# Patient Record
Sex: FEMALE | Race: WHITE | NOT HISPANIC OR LATINO | URBAN - METROPOLITAN AREA
[De-identification: names, ages, dates, MRNs, and addresses within clinical notes are randomized per-mention and may not be internally consistent; named-entity substitution may affect disease eponyms.]

---

## 2017-12-07 ENCOUNTER — OFFICE (AMBULATORY)
Dept: URBAN - METROPOLITAN AREA CLINIC 64 | Facility: CLINIC | Age: 52
End: 2017-12-07
Payer: COMMERCIAL

## 2017-12-07 ENCOUNTER — ON CAMPUS - OUTPATIENT (AMBULATORY)
Dept: URBAN - METROPOLITAN AREA HOSPITAL 2 | Facility: HOSPITAL | Age: 52
End: 2017-12-07
Payer: COMMERCIAL

## 2017-12-07 VITALS
DIASTOLIC BLOOD PRESSURE: 59 MMHG | RESPIRATION RATE: 14 BRPM | DIASTOLIC BLOOD PRESSURE: 70 MMHG | RESPIRATION RATE: 13 BRPM | SYSTOLIC BLOOD PRESSURE: 91 MMHG | OXYGEN SATURATION: 96 % | DIASTOLIC BLOOD PRESSURE: 46 MMHG | HEART RATE: 54 BPM | OXYGEN SATURATION: 100 % | OXYGEN SATURATION: 99 % | OXYGEN SATURATION: 98 % | SYSTOLIC BLOOD PRESSURE: 94 MMHG | DIASTOLIC BLOOD PRESSURE: 64 MMHG | DIASTOLIC BLOOD PRESSURE: 71 MMHG | DIASTOLIC BLOOD PRESSURE: 51 MMHG | HEART RATE: 50 BPM | HEART RATE: 52 BPM | RESPIRATION RATE: 16 BRPM | HEIGHT: 64 IN | DIASTOLIC BLOOD PRESSURE: 67 MMHG | SYSTOLIC BLOOD PRESSURE: 84 MMHG | SYSTOLIC BLOOD PRESSURE: 138 MMHG | SYSTOLIC BLOOD PRESSURE: 101 MMHG | RESPIRATION RATE: 18 BRPM | TEMPERATURE: 97.3 F | HEART RATE: 62 BPM | WEIGHT: 135 LBS | SYSTOLIC BLOOD PRESSURE: 90 MMHG | HEART RATE: 48 BPM | HEART RATE: 65 BPM | SYSTOLIC BLOOD PRESSURE: 144 MMHG | HEART RATE: 51 BPM | DIASTOLIC BLOOD PRESSURE: 58 MMHG

## 2017-12-07 DIAGNOSIS — Z12.11 ENCOUNTER FOR SCREENING FOR MALIGNANT NEOPLASM OF COLON: ICD-10-CM

## 2017-12-07 DIAGNOSIS — K62.1 RECTAL POLYP: ICD-10-CM

## 2017-12-07 LAB
GI HISTOLOGY: A. UNSPECIFIED: (no result)
GI HISTOLOGY: PDF REPORT: (no result)

## 2017-12-07 PROCEDURE — 45385 COLONOSCOPY W/LESION REMOVAL: CPT | Mod: 33

## 2017-12-07 PROCEDURE — 88305 TISSUE EXAM BY PATHOLOGIST: CPT | Mod: 33

## 2017-12-07 RX ADMIN — PROPOFOL: 10 INJECTION, EMULSION INTRAVENOUS at 08:09

## 2018-03-08 ENCOUNTER — ON CAMPUS - OUTPATIENT (AMBULATORY)
Dept: URBAN - METROPOLITAN AREA HOSPITAL 2 | Facility: HOSPITAL | Age: 53
End: 2018-03-08

## 2018-03-08 VITALS
RESPIRATION RATE: 16 BRPM | HEART RATE: 53 BPM | WEIGHT: 137 LBS | HEART RATE: 51 BPM | HEART RATE: 56 BPM | OXYGEN SATURATION: 100 % | SYSTOLIC BLOOD PRESSURE: 157 MMHG | RESPIRATION RATE: 19 BRPM | OXYGEN SATURATION: 95 % | OXYGEN SATURATION: 97 % | SYSTOLIC BLOOD PRESSURE: 175 MMHG | OXYGEN SATURATION: 96 % | HEIGHT: 64 IN | DIASTOLIC BLOOD PRESSURE: 80 MMHG | SYSTOLIC BLOOD PRESSURE: 119 MMHG | TEMPERATURE: 97.2 F | DIASTOLIC BLOOD PRESSURE: 79 MMHG | SYSTOLIC BLOOD PRESSURE: 122 MMHG | DIASTOLIC BLOOD PRESSURE: 91 MMHG | DIASTOLIC BLOOD PRESSURE: 83 MMHG

## 2018-03-08 DIAGNOSIS — K21.9 GASTRO-ESOPHAGEAL REFLUX DISEASE WITHOUT ESOPHAGITIS: ICD-10-CM

## 2018-03-08 DIAGNOSIS — T18.128A FOOD IN ESOPHAGUS CAUSING OTHER INJURY, INITIAL ENCOUNTER: ICD-10-CM

## 2018-03-08 PROCEDURE — 43235 EGD DIAGNOSTIC BRUSH WASH: CPT

## 2018-03-08 RX ORDER — OMEPRAZOLE 40 MG/1
40 CAPSULE, DELAYED RELEASE ORAL
Qty: 90 | Refills: 3 | Status: ACTIVE
Start: 2018-03-08

## 2018-03-08 RX ADMIN — PROPOFOL: 10 INJECTION, EMULSION INTRAVENOUS at 07:32

## 2019-07-02 ENCOUNTER — HOSPITAL ENCOUNTER (OUTPATIENT)
Facility: HOSPITAL | Age: 54
Setting detail: OBSERVATION
Discharge: HOME | End: 2019-07-03
Attending: INTERNAL MEDICINE | Admitting: INTERNAL MEDICINE

## 2019-07-02 DIAGNOSIS — N17.9 ACUTE RENAL FAILURE, UNSPECIFIED ACUTE RENAL FAILURE TYPE (HCC): Primary | ICD-10-CM

## 2019-07-02 DIAGNOSIS — E87.6 HYPOKALEMIA: ICD-10-CM

## 2019-07-02 DIAGNOSIS — E83.52 HYPERCALCEMIA: ICD-10-CM

## 2019-07-02 LAB
ALBUMIN SERPL-MCNC: 4.2 G/DL (ref 3.5–4.8)
ALBUMIN/GLOB SERPL: 1.2 G/DL (ref 1–1.7)
ALP SERPL-CCNC: 41 U/L (ref 32–91)
ALT SERPL W P-5'-P-CCNC: 32 U/L (ref 14–54)
ANION GAP SERPL CALCULATED.3IONS-SCNC: 14.1 MMOL/L (ref 10–20)
AST SERPL-CCNC: 32 U/L (ref 15–41)
BACTERIA UR QL AUTO: ABNORMAL /HPF
BASOPHILS # BLD AUTO: 0.1 10*3/MM3 (ref 0–0.2)
BASOPHILS NFR BLD AUTO: 0.9 % (ref 0–1.5)
BILIRUB SERPL-MCNC: 0.8 MG/DL (ref 0.3–1.2)
BILIRUB UR QL STRIP: NEGATIVE
BUN BLD-MCNC: 31 MG/DL (ref 8–20)
BUN/CREAT SERPL: 14.1 (ref 5.4–26.2)
CALCIUM SPEC-SCNC: 11.8 MG/DL (ref 8.9–10.3)
CHLORIDE SERPL-SCNC: 104 MMOL/L (ref 101–111)
CLARITY UR: CLEAR
CO2 SERPL-SCNC: 23 MMOL/L (ref 22–32)
COLOR UR: YELLOW
CREAT BLD-MCNC: 2.2 MG/DL (ref 0.4–1)
DEPRECATED RDW RBC AUTO: 40.3 FL (ref 37–54)
EOSINOPHIL # BLD AUTO: 0.2 10*3/MM3 (ref 0–0.4)
EOSINOPHIL NFR BLD AUTO: 2.8 % (ref 0.3–6.2)
ERYTHROCYTE [DISTWIDTH] IN BLOOD BY AUTOMATED COUNT: 12.9 % (ref 12.3–15.4)
GFR SERPL CREATININE-BSD FRML MDRD: 23 ML/MIN/1.73
GLOBULIN UR ELPH-MCNC: 3.4 GM/DL (ref 2.5–3.8)
GLUCOSE BLD-MCNC: 87 MG/DL (ref 65–99)
GLUCOSE UR STRIP-MCNC: NEGATIVE MG/DL
HCT VFR BLD AUTO: 32.2 % (ref 34–46.6)
HGB BLD-MCNC: 11.3 G/DL (ref 12–15.9)
HGB UR QL STRIP.AUTO: ABNORMAL
HYALINE CASTS UR QL AUTO: ABNORMAL /LPF
KETONES UR QL STRIP: NEGATIVE
LEUKOCYTE ESTERASE UR QL STRIP.AUTO: NEGATIVE
LYMPHOCYTES # BLD AUTO: 1.8 10*3/MM3 (ref 0.7–3.1)
LYMPHOCYTES NFR BLD AUTO: 29.4 % (ref 19.6–45.3)
MCH RBC QN AUTO: 31 PG (ref 26.6–33)
MCHC RBC AUTO-ENTMCNC: 35 G/DL (ref 31.5–35.7)
MCV RBC AUTO: 88.5 FL (ref 79–97)
MONOCYTES # BLD AUTO: 0.3 10*3/MM3 (ref 0.1–0.9)
MONOCYTES NFR BLD AUTO: 5.5 % (ref 5–12)
NEUTROPHILS # BLD AUTO: 3.7 10*3/MM3 (ref 1.7–7)
NEUTROPHILS NFR BLD AUTO: 61.4 % (ref 42.7–76)
NITRITE UR QL STRIP: NEGATIVE
NRBC BLD AUTO-RTO: 0.1 /100 WBC (ref 0–0.2)
PH UR STRIP.AUTO: 6.5 [PH] (ref 5–8)
PLATELET # BLD AUTO: 235 10*3/MM3 (ref 140–450)
PMV BLD AUTO: 7.4 FL (ref 6–12)
POTASSIUM BLD-SCNC: 3.1 MMOL/L (ref 3.6–5.1)
PROT SERPL-MCNC: 7.6 G/DL (ref 6.1–7.9)
PROT UR QL STRIP: NEGATIVE
RBC # BLD AUTO: 3.63 10*6/MM3 (ref 3.77–5.28)
RBC # UR: ABNORMAL /HPF
REF LAB TEST METHOD: ABNORMAL
SODIUM BLD-SCNC: 138 MMOL/L (ref 136–144)
SP GR UR STRIP: 1.01 (ref 1–1.03)
SQUAMOUS #/AREA URNS HPF: ABNORMAL /HPF
UROBILINOGEN UR QL STRIP: ABNORMAL
WBC NRBC COR # BLD: 6 10*3/MM3 (ref 3.4–10.8)
WBC UR QL AUTO: ABNORMAL /HPF

## 2019-07-02 PROCEDURE — 96360 HYDRATION IV INFUSION INIT: CPT

## 2019-07-02 PROCEDURE — 93005 ELECTROCARDIOGRAM TRACING: CPT | Performed by: NURSE PRACTITIONER

## 2019-07-02 PROCEDURE — 25010000002 HEPARIN (PORCINE) PER 1000 UNITS: Performed by: NURSE PRACTITIONER

## 2019-07-02 PROCEDURE — 80053 COMPREHEN METABOLIC PANEL: CPT | Performed by: NURSE PRACTITIONER

## 2019-07-02 PROCEDURE — 81001 URINALYSIS AUTO W/SCOPE: CPT | Performed by: NURSE PRACTITIONER

## 2019-07-02 PROCEDURE — 99283 EMERGENCY DEPT VISIT LOW MDM: CPT

## 2019-07-02 PROCEDURE — 99219 PR INITIAL OBSERVATION CARE/DAY 50 MINUTES: CPT | Performed by: NURSE PRACTITIONER

## 2019-07-02 PROCEDURE — 85025 COMPLETE CBC W/AUTO DIFF WBC: CPT | Performed by: NURSE PRACTITIONER

## 2019-07-02 PROCEDURE — G0378 HOSPITAL OBSERVATION PER HR: HCPCS

## 2019-07-02 PROCEDURE — 96372 THER/PROPH/DIAG INJ SC/IM: CPT

## 2019-07-02 RX ORDER — ONDANSETRON 2 MG/ML
4 INJECTION INTRAMUSCULAR; INTRAVENOUS EVERY 6 HOURS PRN
Status: DISCONTINUED | OUTPATIENT
Start: 2019-07-02 | End: 2019-07-03 | Stop reason: HOSPADM

## 2019-07-02 RX ORDER — MONTELUKAST SODIUM 10 MG/1
10 TABLET ORAL NIGHTLY
COMMUNITY

## 2019-07-02 RX ORDER — SODIUM CHLORIDE 0.9 % (FLUSH) 0.9 %
3 SYRINGE (ML) INJECTION EVERY 12 HOURS SCHEDULED
Status: DISCONTINUED | OUTPATIENT
Start: 2019-07-02 | End: 2019-07-03 | Stop reason: HOSPADM

## 2019-07-02 RX ORDER — CALCITRIOL 0.25 UG/1
0.25 CAPSULE, LIQUID FILLED ORAL DAILY
COMMUNITY

## 2019-07-02 RX ORDER — ATORVASTATIN CALCIUM 10 MG/1
10 TABLET, FILM COATED ORAL DAILY
COMMUNITY

## 2019-07-02 RX ORDER — ACETAMINOPHEN, ASPIRIN AND CAFFEINE 250; 250; 65 MG/1; MG/1; MG/1
2 TABLET, FILM COATED ORAL DAILY PRN
COMMUNITY
End: 2019-07-23

## 2019-07-02 RX ORDER — HEPARIN SODIUM 5000 [USP'U]/ML
5000 INJECTION, SOLUTION INTRAVENOUS; SUBCUTANEOUS EVERY 12 HOURS SCHEDULED
Status: DISCONTINUED | OUTPATIENT
Start: 2019-07-02 | End: 2019-07-03 | Stop reason: HOSPADM

## 2019-07-02 RX ORDER — NAPROXEN SODIUM 220 MG
220 TABLET ORAL 2 TIMES DAILY PRN
COMMUNITY
End: 2019-07-03 | Stop reason: HOSPADM

## 2019-07-02 RX ORDER — PHENOL 1.4 %
4000 AEROSOL, SPRAY (ML) MUCOUS MEMBRANE DAILY
COMMUNITY
End: 2019-07-23

## 2019-07-02 RX ORDER — SODIUM CHLORIDE 0.9 % (FLUSH) 0.9 %
10 SYRINGE (ML) INJECTION AS NEEDED
Status: DISCONTINUED | OUTPATIENT
Start: 2019-07-02 | End: 2019-07-03 | Stop reason: HOSPADM

## 2019-07-02 RX ORDER — SODIUM CHLORIDE 9 MG/ML
100 INJECTION, SOLUTION INTRAVENOUS CONTINUOUS
Status: DISCONTINUED | OUTPATIENT
Start: 2019-07-02 | End: 2019-07-03 | Stop reason: HOSPADM

## 2019-07-02 RX ORDER — POTASSIUM CHLORIDE 20 MEQ/1
40 TABLET, EXTENDED RELEASE ORAL ONCE
Status: COMPLETED | OUTPATIENT
Start: 2019-07-02 | End: 2019-07-02

## 2019-07-02 RX ORDER — LEVOTHYROXINE SODIUM 0.12 MG/1
125 TABLET ORAL DAILY
Status: ON HOLD | COMMUNITY
End: 2019-07-25 | Stop reason: SDUPTHER

## 2019-07-02 RX ORDER — AMLODIPINE BESYLATE 5 MG/1
5 TABLET ORAL DAILY
COMMUNITY

## 2019-07-02 RX ORDER — DOXYCYCLINE HYCLATE 50 MG/1
50 CAPSULE ORAL 2 TIMES DAILY
COMMUNITY

## 2019-07-02 RX ORDER — SODIUM CHLORIDE 0.9 % (FLUSH) 0.9 %
3-10 SYRINGE (ML) INJECTION AS NEEDED
Status: DISCONTINUED | OUTPATIENT
Start: 2019-07-02 | End: 2019-07-03 | Stop reason: HOSPADM

## 2019-07-02 RX ADMIN — SODIUM CHLORIDE 100 ML/HR: 900 INJECTION, SOLUTION INTRAVENOUS at 23:49

## 2019-07-02 RX ADMIN — HEPARIN SODIUM 5000 UNITS: 5000 INJECTION INTRAVENOUS; SUBCUTANEOUS at 22:45

## 2019-07-02 RX ADMIN — POTASSIUM CHLORIDE 40 MEQ: 1500 TABLET, EXTENDED RELEASE ORAL at 22:45

## 2019-07-02 RX ADMIN — SODIUM CHLORIDE 1000 ML: 900 INJECTION, SOLUTION INTRAVENOUS at 18:16

## 2019-07-02 RX ADMIN — Medication 10 ML: at 22:41

## 2019-07-02 NOTE — ED PROVIDER NOTES
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  Patient states she had appointment with new internal medicine physician this morning and had labs drawn.  She states that her physician called her this afternoon and told her to come to the ED for further evaluation of her abnormal labs.  She states her GFR was 23, creatinine was 2.3, and she had an elevated calcium level.  Patient denies any complaints and states she \"feels fine.\"            Review of Systems   Constitutional: Negative for fatigue and fever.   HENT: Negative for sore throat.    Respiratory: Negative for cough, chest tightness and shortness of breath.    Cardiovascular: Negative for chest pain, palpitations and leg swelling.   Gastrointestinal: Negative for abdominal pain, diarrhea, nausea and vomiting.   Genitourinary: Negative for difficulty urinating, dyspareunia, dysuria, frequency and urgency.   Musculoskeletal: Negative for back pain.   Skin: Negative for rash.   Neurological: Negative for dizziness, weakness, numbness and headaches.       No past medical history on file.    No Known Allergies    No past surgical history on file.    No family history on file.    Social History     Socioeconomic History   • Marital status:      Spouse name: Not on file   • Number of children: Not on file   • Years of education: Not on file   • Highest education level: Not on file           Objective   Physical Exam   Constitutional: She appears well-developed.   Vital signs and triage nurse note reviewed.  Constitutional: Awake, alert; well-developed and well-nourished. No acute distress is noted.  HEENT: Normocephalic, atraumatic; pupils are PERRL with intact EOM; oropharynx is pink and moist without exudate or erythema.  No drooling or pooling of oral secretions.  Neck: Supple, full " range of motion without pain; no cervical lymphadenopathy. Normal phonation.  Cardiovascular: Regular rate and rhythm, normal S1-S2.  No murmur noted.  Pulmonary: Respiratory effort regular nonlabored, breath sounds clear to auscultation all fields.  Abdomen: Soft, nontender, nondistended with normoactive bowel sounds; no rebound or guarding.  Musculoskeletal: Independent range of motion of all extremities with no palpable tenderness or edema.  Neuro: Alert oriented x3, speech is clear and appropriate, GCS 15.    Skin: Flesh tone, warm, dry, intact; no erythematous or petechial rash or lesion.        Procedures           ED Course      Labs Reviewed   COMPREHENSIVE METABOLIC PANEL - Abnormal; Notable for the following components:       Result Value    BUN 31 (*)     Creatinine 2.20 (*)     Potassium 3.1 (*)     Calcium 11.8 (*)     eGFR Non  Amer 23 (*)     All other components within normal limits   URINALYSIS W/ CULTURE IF INDICATED - Abnormal; Notable for the following components:    Blood, UA Trace (*)     All other components within normal limits   CBC WITH AUTO DIFFERENTIAL - Abnormal; Notable for the following components:    RBC 3.63 (*)     Hemoglobin 11.3 (*)     Hematocrit 32.2 (*)     All other components within normal limits   URINALYSIS, MICROSCOPIC ONLY - Abnormal; Notable for the following components:    WBC, UA 0-2 (*)     Squamous Epithelial Cells, UA 3-6 (*)     All other components within normal limits   CBC AND DIFFERENTIAL    Narrative:     The following orders were created for panel order CBC & Differential.  Procedure                               Abnormality         Status                     ---------                               -----------         ------                     CBC Auto Differential[[id number]]        Abnormal            Final result                 Please view results for these tests on the individual orders.     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  She had labs obtained.  She was given 1 L normal saline IV fluid bolus.    Upon review of labs, patient was discussed with her primary care physician Dr. Floyd who recommends admission with nephrology consultation due to acute nature of patient's abnormal labs.    Diagnosis and treatment plan discussed with patient.  Patient agreeable to plan.   Patient was discussed with the hospitalist nurse practitioner.  She will be admitted to the hospital for further evaluation and treatment.    Patient Progress  Patient progress: stable        Final diagnoses:   Acute renal failure, unspecified acute renal failure type (CMS/[facility])   Hypercalcemia   Hypokalemia            Nicolle Zhou APRN  07/02/19 2001

## 2019-07-03 VITALS
RESPIRATION RATE: 17 BRPM | OXYGEN SATURATION: 99 % | BODY MASS INDEX: 21.53 KG/M2 | WEIGHT: 126.1 LBS | DIASTOLIC BLOOD PRESSURE: 79 MMHG | TEMPERATURE: 98.1 F | SYSTOLIC BLOOD PRESSURE: 124 MMHG | HEART RATE: 56 BPM | HEIGHT: 64 IN

## 2019-07-03 LAB
ANION GAP SERPL CALCULATED.3IONS-SCNC: 12.1 MMOL/L (ref 10–20)
ANION GAP SERPL CALCULATED.3IONS-SCNC: 13.4 MMOL/L (ref 10–20)
ARTICHOKE IGE QN: 99 MG/DL (ref 0–100)
BASOPHILS # BLD AUTO: 0 10*3/MM3 (ref 0–0.2)
BASOPHILS NFR BLD AUTO: 0.7 % (ref 0–1.5)
BUN BLD-MCNC: 27 MG/DL (ref 8–20)
BUN BLD-MCNC: 27 MG/DL (ref 8–20)
BUN/CREAT SERPL: 14.2 (ref 5.4–26.2)
BUN/CREAT SERPL: 15 (ref 5.4–26.2)
CALCIUM SPEC-SCNC: 10.1 MG/DL (ref 8.9–10.3)
CALCIUM SPEC-SCNC: 9.8 MG/DL (ref 8.9–10.3)
CHLORIDE SERPL-SCNC: 110 MMOL/L (ref 101–111)
CHLORIDE SERPL-SCNC: 111 MMOL/L (ref 101–111)
CHOLEST SERPL-MCNC: 158 MG/DL
CO2 SERPL-SCNC: 21 MMOL/L (ref 22–32)
CO2 SERPL-SCNC: 23 MMOL/L (ref 22–32)
CREAT BLD-MCNC: 1.8 MG/DL (ref 0.4–1)
CREAT BLD-MCNC: 1.9 MG/DL (ref 0.4–1)
DEPRECATED RDW RBC AUTO: 41.1 FL (ref 37–54)
EOSINOPHIL # BLD AUTO: 0.2 10*3/MM3 (ref 0–0.4)
EOSINOPHIL NFR BLD AUTO: 3.7 % (ref 0.3–6.2)
ERYTHROCYTE [DISTWIDTH] IN BLOOD BY AUTOMATED COUNT: 13.1 % (ref 12.3–15.4)
GFR SERPL CREATININE-BSD FRML MDRD: 27 ML/MIN/1.73
GFR SERPL CREATININE-BSD FRML MDRD: 29 ML/MIN/1.73
GLUCOSE BLD-MCNC: 78 MG/DL (ref 65–99)
GLUCOSE BLD-MCNC: 86 MG/DL (ref 65–99)
HCT VFR BLD AUTO: 29.8 % (ref 34–46.6)
HDLC SERPL QL: 3.76
HDLC SERPL-MCNC: 42 MG/DL
HGB BLD-MCNC: 10.5 G/DL (ref 12–15.9)
LDLC/HDLC SERPL: 1.97 {RATIO}
LYMPHOCYTES # BLD AUTO: 2.4 10*3/MM3 (ref 0.7–3.1)
LYMPHOCYTES NFR BLD AUTO: 40.1 % (ref 19.6–45.3)
MAGNESIUM SERPL-MCNC: 1.6 MG/DL (ref 1.8–2.5)
MCH RBC QN AUTO: 31.4 PG (ref 26.6–33)
MCHC RBC AUTO-ENTMCNC: 35.1 G/DL (ref 31.5–35.7)
MCV RBC AUTO: 89.4 FL (ref 79–97)
MONOCYTES # BLD AUTO: 0.4 10*3/MM3 (ref 0.1–0.9)
MONOCYTES NFR BLD AUTO: 6.5 % (ref 5–12)
NEUTROPHILS # BLD AUTO: 3 10*3/MM3 (ref 1.7–7)
NEUTROPHILS NFR BLD AUTO: 49 % (ref 42.7–76)
NRBC BLD AUTO-RTO: 0.1 /100 WBC (ref 0–0.2)
PHOSPHATE SERPL-MCNC: 2.8 MG/DL (ref 2.4–4.7)
PLATELET # BLD AUTO: 209 10*3/MM3 (ref 140–450)
PMV BLD AUTO: 7.8 FL (ref 6–12)
POTASSIUM BLD-SCNC: 3.1 MMOL/L (ref 3.6–5.1)
POTASSIUM BLD-SCNC: 4.4 MMOL/L (ref 3.6–5.1)
RBC # BLD AUTO: 3.34 10*6/MM3 (ref 3.77–5.28)
SODIUM BLD-SCNC: 140 MMOL/L (ref 136–144)
SODIUM BLD-SCNC: 143 MMOL/L (ref 136–144)
TRIGL SERPL-MCNC: 167 MG/DL
TSH SERPL DL<=0.05 MIU/L-ACNC: 18.54 MIU/ML (ref 0.34–5.6)
VLDLC SERPL-MCNC: 33.4 MG/DL
WBC NRBC COR # BLD: 6 10*3/MM3 (ref 3.4–10.8)

## 2019-07-03 PROCEDURE — 80048 BASIC METABOLIC PNL TOTAL CA: CPT | Performed by: NURSE PRACTITIONER

## 2019-07-03 PROCEDURE — 80061 LIPID PANEL: CPT | Performed by: NURSE PRACTITIONER

## 2019-07-03 PROCEDURE — 84100 ASSAY OF PHOSPHORUS: CPT | Performed by: NURSE PRACTITIONER

## 2019-07-03 PROCEDURE — 99217 PR OBSERVATION CARE DISCHARGE MANAGEMENT: CPT | Performed by: INTERNAL MEDICINE

## 2019-07-03 PROCEDURE — 51798 US URINE CAPACITY MEASURE: CPT

## 2019-07-03 PROCEDURE — 83735 ASSAY OF MAGNESIUM: CPT | Performed by: NURSE PRACTITIONER

## 2019-07-03 PROCEDURE — 84443 ASSAY THYROID STIM HORMONE: CPT | Performed by: NURSE PRACTITIONER

## 2019-07-03 PROCEDURE — 85025 COMPLETE CBC W/AUTO DIFF WBC: CPT | Performed by: NURSE PRACTITIONER

## 2019-07-03 PROCEDURE — 80048 BASIC METABOLIC PNL TOTAL CA: CPT | Performed by: INTERNAL MEDICINE

## 2019-07-03 PROCEDURE — G0378 HOSPITAL OBSERVATION PER HR: HCPCS

## 2019-07-03 PROCEDURE — 96361 HYDRATE IV INFUSION ADD-ON: CPT

## 2019-07-03 RX ORDER — LEVOTHYROXINE SODIUM 0.12 MG/1
125 TABLET ORAL
Status: DISCONTINUED | OUTPATIENT
Start: 2019-07-03 | End: 2019-07-03 | Stop reason: HOSPADM

## 2019-07-03 RX ORDER — MONTELUKAST SODIUM 10 MG/1
10 TABLET ORAL NIGHTLY
Status: DISCONTINUED | OUTPATIENT
Start: 2019-07-03 | End: 2019-07-03 | Stop reason: HOSPADM

## 2019-07-03 RX ORDER — POTASSIUM CHLORIDE 20 MEQ/1
40 TABLET, EXTENDED RELEASE ORAL ONCE
Status: COMPLETED | OUTPATIENT
Start: 2019-07-03 | End: 2019-07-03

## 2019-07-03 RX ORDER — AMLODIPINE BESYLATE 5 MG/1
5 TABLET ORAL DAILY
Status: DISCONTINUED | OUTPATIENT
Start: 2019-07-03 | End: 2019-07-03 | Stop reason: HOSPADM

## 2019-07-03 RX ORDER — CALCITRIOL 0.25 UG/1
0.25 CAPSULE, LIQUID FILLED ORAL 2 TIMES DAILY
Status: DISCONTINUED | OUTPATIENT
Start: 2019-07-03 | End: 2019-07-03 | Stop reason: HOSPADM

## 2019-07-03 RX ORDER — ATORVASTATIN CALCIUM 10 MG/1
10 TABLET, FILM COATED ORAL DAILY
Status: DISCONTINUED | OUTPATIENT
Start: 2019-07-03 | End: 2019-07-03 | Stop reason: HOSPADM

## 2019-07-03 RX ADMIN — SODIUM CHLORIDE 100 ML/HR: 900 INJECTION, SOLUTION INTRAVENOUS at 14:11

## 2019-07-03 RX ADMIN — ATORVASTATIN CALCIUM 10 MG: 10 TABLET, FILM COATED ORAL at 08:34

## 2019-07-03 RX ADMIN — MONTELUKAST SODIUM 10 MG: 10 TABLET, COATED ORAL at 01:50

## 2019-07-03 RX ADMIN — LEVOTHYROXINE SODIUM 125 MCG: 125 TABLET ORAL at 05:53

## 2019-07-03 RX ADMIN — POTASSIUM CHLORIDE 40 MEQ: 1500 TABLET, EXTENDED RELEASE ORAL at 08:33

## 2019-07-03 RX ADMIN — AMLODIPINE BESYLATE 5 MG: 5 TABLET ORAL at 08:34

## 2019-07-03 NOTE — PLAN OF CARE
Problem: Patient Care Overview  Goal: Plan of Care Review  Outcome: Outcome(s) achieved Date Met: 07/03/19    Goal: Individualization and Mutuality  Outcome: Outcome(s) achieved Date Met: 07/03/19    Goal: Discharge Needs Assessment  Outcome: Outcome(s) achieved Date Met: 07/03/19    Goal: Interprofessional Rounds/Family Conf  Outcome: Outcome(s) achieved Date Met: 07/03/19      Problem: Pain, Chronic (Adult)  Goal: Identify Related Risk Factors and Signs and Symptoms  Outcome: Outcome(s) achieved Date Met: 07/03/19    Goal: Acceptable Pain/Comfort Level and Functional Ability  Outcome: Outcome(s) achieved Date Met: 07/03/19      Problem: Renal Failure/Kidney Injury, Acute (Adult)  Goal: Signs and Symptoms of Listed Potential Problems Will be Absent, Minimized or Managed (Renal Failure/Kidney Injury, Acute)  Outcome: Outcome(s) achieved Date Met: 07/03/19

## 2019-07-03 NOTE — DISCHARGE SUMMARY
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 female presented with acute kidney injury.  She had been taking increased amounts of NSAIDs for shoulder pain.  Creatinine was found to be 2.3 she was started on IV fluids with improvement to 1.8 by the time of discharge.  She was wanting to go home and as she was having adequate urine output and felt stable to discharge home with outpatient follow-up for BMP testing.    Past Medical History:     Past Medical History:   Diagnosis Date   • Arthritis    • Cancer (CMS/HCC)    • Elevated cholesterol    • History of transfusion    • Hypertension        Past Surgical History:     Past Surgical History:   Procedure Laterality Date   • ABDOMINAL SURGERY     • COLONOSCOPY     • SKIN BIOPSY     • TONSILLECTOMY         Social History:   Social History     Socioeconomic History   • Marital status:      Spouse name: Not on file   • Number of children: Not on file   • Years of education: Not on file   • Highest education level: Not on file   Tobacco Use   • Smoking status: Former Smoker     Packs/day: 0.25     Years: 10.00     Pack years: 2.50     Types: Cigarettes   • Smokeless tobacco: Never Used   Substance and Sexual Activity   • Alcohol use: No     Frequency: Never   • Drug use: No   • Sexual activity: Defer       Procedures  Performed:none         Consults:   Consults     Date and Time Order Name Status Description    7/2/2019 1931 Hospitalist (on-call MD unless specified) Completed           Pertinent Test Results:     Lab Results (last 72 hours)     Procedure Component Value Units Date/Time    Basic Metabolic Panel [[id number]]  (Abnormal) Collected:  07/03/19 1459    Specimen:  Blood Updated:  07/03/19 1652     Glucose 86 mg/dL      BUN 27 mg/dL      Creatinine 1.80 mg/dL      Sodium 140 mmol/L      Potassium 4.4 mmol/L      Comment: Result checked         Chloride 110 mmol/L      CO2 21.0 mmol/L      Calcium 9.8 mg/dL      eGFR Non African Amer 29 mL/min/1.73      BUN/Creatinine Ratio 15.0     Anion Gap 13.4 mmol/L     Basic Metabolic Panel [[id number]]  (Abnormal) Collected:  07/03/19 0243    Specimen:  Blood Updated:  07/03/19 0354     Glucose 78 mg/dL      BUN 27 mg/dL      Creatinine 1.90 mg/dL      Sodium 143 mmol/L      Potassium 3.1 mmol/L      Chloride 111 mmol/L      CO2 23.0 mmol/L      Calcium 10.1 mg/dL      eGFR Non African Amer 27 mL/min/1.73      BUN/Creatinine Ratio 14.2     Anion Gap 12.1 mmol/L     Lipid Panel [[id number]]  (Abnormal) Collected:  07/03/19 0243    Specimen:  Blood Updated:  07/03/19 0354     Total Cholesterol 158 mg/dL      Triglycerides 167 mg/dL      HDL Cholesterol 42 mg/dL      LDL Cholesterol  99 mg/dL      VLDL Cholesterol 33.4 mg/dL      LDL/HDL Ratio 1.97     Chol/HDL Ratio 3.76    Narrative:       The following guidelines have been recommended by the NCEP for Total Cholesterol, Total Triglycerides, LDL Cholesterol, and HDL Cholesterols    Total Cholesterol  Desirable:        <200 mg/dL  Borderline High:  200-239 mg/dL  High:             > or = 240 mg/dL    Total Triglyceride  Normal:           <150 mg/dL  Borderline High:  150-199 mg/dL  High:             200-499 mg/dL  Very High:        > or = 500 mg/dL    HDL Cholesterol  Low HDL:          <40 mg/dL  Normal:           40-60 mg/dL  Desirable:         >60 mg/dL    LDL Cholesterol  Optimal:          <100 mg/dL  Low Risk:         100-129 mg/dL  Borderline High:  130-159 mg/dL  High:             160-189 mg/dL  Very High:        > or = 190 mg/dL    The following ratios of LDL to HDL and Total cholesterol to HDL are for information only:    LDL/HDL Ratio  Desirable:        <5  Optimal:          < or = 3.5    Total Cholesterol/HDL Ratio  Low Risk:         3.3-4.4  Average Risk:     4.4-7.1  Medium Risk:      7.1-11  High Risk:        >11       TSH [[id number]]  (Abnormal) Collected:  07/03/19 0243    Specimen:  Blood Updated:  07/03/19 0354     TSH 18.540 mIU/mL      Comment: Results may be falsely decreased if patient taking Biotin.       Phosphorus [[id number]]  (Normal) Collected:  07/03/19 0243    Specimen:  Blood Updated:  07/03/19 0354     Phosphorus 2.8 mg/dL     Magnesium [[id number]]  (Abnormal) Collected:  07/03/19 0243    Specimen:  Blood Updated:  07/03/19 0354     Magnesium 1.6 mg/dL     CBC Auto Differential [[id number]]  (Abnormal) Collected:  07/03/19 0243    Specimen:  Blood Updated:  07/03/19 0313     WBC 6.00 10*3/mm3      RBC 3.34 10*6/mm3      Hemoglobin 10.5 g/dL      Hematocrit 29.8 %      MCV 89.4 fL      MCH 31.4 pg      MCHC 35.1 g/dL      RDW 13.1 %      RDW-SD 41.1 fl      MPV 7.8 fL      Platelets 209 10*3/mm3      Neutrophil % 49.0 %      Lymphocyte % 40.1 %      Monocyte % 6.5 %      Eosinophil % 3.7 %      Basophil % 0.7 %      Neutrophils, Absolute 3.00 10*3/mm3      Lymphocytes, Absolute 2.40 10*3/mm3      Monocytes, Absolute 0.40 10*3/mm3      Eosinophils, Absolute 0.20 10*3/mm3      Basophils, Absolute 0.00 10*3/mm3      nRBC 0.1 /100 WBC     Comprehensive Metabolic Panel [[id number]]  (Abnormal) Collected:  07/02/19 1813    Specimen:  Blood Updated:  07/02/19 1906     Glucose 87 mg/dL      BUN 31 mg/dL      Creatinine 2.20 mg/dL      Sodium 138 mmol/L      Potassium 3.1 mmol/L      Chloride 104 mmol/L      CO2 23.0 mmol/L       Calcium 11.8 mg/dL      Total Protein 7.6 g/dL      Albumin 4.20 g/dL      ALT (SGPT) 32 U/L      AST (SGOT) 32 U/L      Alkaline Phosphatase 41 U/L      Total Bilirubin 0.8 mg/dL      eGFR Non African Amer 23 mL/min/1.73      Globulin 3.4 gm/dL      A/G Ratio 1.2 g/dL      BUN/Creatinine Ratio 14.1     Anion Gap 14.1 mmol/L     Urinalysis With Culture If Indicated - Urine, Clean Catch [[id number]]  (Abnormal) Collected:  07/02/19 1814    Specimen:  Urine, Clean Catch Updated:  07/02/19 1902     Color, UA Yellow     Appearance, UA Clear     pH, UA 6.5     Specific Gravity, UA 1.008     Glucose, UA Negative     Ketones, UA Negative     Bilirubin, UA Negative     Blood, UA Trace     Protein, UA Negative     Leuk Esterase, UA Negative     Nitrite, UA Negative     Urobilinogen, UA 0.2 E.U./dL    Urinalysis, Microscopic Only - Urine, Clean Catch [[id number]]  (Abnormal) Collected:  07/02/19 1814    Specimen:  Urine, Clean Catch Updated:  07/02/19 1902     RBC, UA None Seen /HPF      WBC, UA 0-2 /HPF      Bacteria, UA None Seen /HPF      Squamous Epithelial Cells, UA 3-6 /HPF      Hyaline Casts, UA None Seen /LPF      Methodology Manual Light Microscopy    CBC & Differential [[id number]] Collected:  07/02/19 1813    Specimen:  Blood Updated:  07/02/19 1832    Narrative:       The following orders were created for panel order CBC & Differential.  Procedure                               Abnormality         Status                     ---------                               -----------         ------                     CBC Auto Differential[[id number]]        Abnormal            Final result                 Please view results for these tests on the individual orders.    CBC Auto Differential [[id number]]  (Abnormal) Collected:  07/02/19 1813    Specimen:  Blood Updated:  07/02/19 1832     WBC 6.00 10*3/mm3      RBC 3.63 10*6/mm3      Hemoglobin 11.3 g/dL      Hematocrit 32.2 %      MCV 88.5 fL      MCH 31.0 pg      MCHC 35.0  g/dL      RDW 12.9 %      RDW-SD 40.3 fl      MPV 7.4 fL      Platelets 235 10*3/mm3      Neutrophil % 61.4 %      Lymphocyte % 29.4 %      Monocyte % 5.5 %      Eosinophil % 2.8 %      Basophil % 0.9 %      Neutrophils, Absolute 3.70 10*3/mm3      Lymphocytes, Absolute 1.80 10*3/mm3      Monocytes, Absolute 0.30 10*3/mm3      Eosinophils, Absolute 0.20 10*3/mm3      Basophils, Absolute 0.10 10*3/mm3      nRBC 0.1 /100 WBC              No results found for: BLOODCX   No results found for: URINECX   No results found for: WOUNDCX   No results found for: RESPCX   No results found for: STOOLCX   No results found for: STOOLCXY   No results found for: MRSACX   No results found for: VRECX   No results found for: CRECX   No components found for: AFBSTAINCX   No results found for: AFBCX   No results found for: AFBCXBLD   No results found for: FUNGUSCX   No components found for: GMSSTAIN   No results found for: KOHPREP   No results found for: ANACX   No results found for: BODYFLDCX   No results found for: CSFCX   No results found for: CULTURE   No results found for: THROATCX   No results found for: THROATCXBS   No results found for: ICECX   No results found for: DICECX   No results found for: GCCX           Imaging Results (all)     None            Condition on Discharge:  Stable     Vital Signs  Temp:  [97.3 °F (36.3 °C)-98.1 °F (36.7 °C)] 98.1 °F (36.7 °C)  Heart Rate:  [51-56] 56  Resp:  [16-17] 17  BP: (109-162)/(71-82) 124/79    Physical Exam:  Physical Exam   Constitutional: She is oriented to person, place, and time. She appears well-developed.   Neck: Normal range of motion.   Cardiovascular: Regular rhythm.   Pulmonary/Chest: Breath sounds normal.   Abdominal: Soft. Bowel sounds are normal.   Neurological: She is alert and oriented to person, place, and time.   Skin: Skin is dry.       Discharge Disposition  Home or Self Care    Discharge Medications     Discharge Medications      Continue These Medications       Instructions Start Date   amLODIPine 5 MG tablet  Commonly known as:  NORVASC   5 mg, Oral, Daily      aspirin-acetaminophen-caffeine 250-250-65 MG per tablet  Commonly known as:  EXCEDRIN MIGRAINE   2 tablets, Oral, Daily PRN      atorvastatin 10 MG tablet  Commonly known as:  LIPITOR   10 mg, Oral, Daily, Mon-Sat       calcitriol 0.25 MCG capsule  Commonly known as:  ROCALTROL   0.25 mcg, Oral, 2 Times Daily      calcium carbonate 600 MG tablet  Commonly known as:  OS-DORIAN   4,000 mg, Oral, Daily      doxycycline 50 MG capsule  Commonly known as:  VIBRAMYCIN   50 mg, Oral, 2 Times Daily      levothyroxine 125 MCG tablet  Commonly known as:  SYNTHROID, LEVOTHROID   125 mcg, Oral, Daily      montelukast 10 MG tablet  Commonly known as:  SINGULAIR   10 mg, Oral, Nightly         Stop These Medications    naproxen sodium 220 MG tablet  Commonly known as:  ALEVE            Discharge Diet:   Diet Instructions     Diet: Cardiac      Discharge Diet:  Cardiac          Activity at Discharge:   Activity Instructions     Activity as Tolerated            Follow-up Appointments  No future appointments.  Additional Instructions for the Follow-ups that You Need to Schedule     Discharge Follow-up with PCP   As directed       Currently Documented PCP:    Luna Cordova MD    PCP Phone Number:    [phone]     Follow Up Details:  primary one week               Test Results Pending at Discharge       Risk for Readmission (LACE) Score: 1 (7/3/2019  6:00 AM)          Orlin Ro DO  07/03/19  5:40 PM

## 2019-07-03 NOTE — PLAN OF CARE
Problem: Patient Care Overview  Goal: Plan of Care Review  Outcome: Resolved for upgrade, new template will be applied    Goal: Individualization and Mutuality  Outcome: Resolved for upgrade, new template will be applied    Goal: Discharge Needs Assessment  Outcome: Resolved for upgrade, new template will be applied    Goal: Interprofessional Rounds/Family Conf  Outcome: Resolved for upgrade, new template will be applied      Problem: Pain, Chronic (Adult)  Goal: Identify Related Risk Factors and Signs and Symptoms  Outcome: Resolved for upgrade, new template will be applied    Goal: Acceptable Pain/Comfort Level and Functional Ability  Outcome: Resolved for upgrade, new template will be applied      Problem: Renal Failure/Kidney Injury, Acute (Adult)  Goal: Signs and Symptoms of Listed Potential Problems Will be Absent, Minimized or Managed (Renal Failure/Kidney Injury, Acute)  Outcome: Resolved for upgrade, new template will be applied

## 2019-07-03 NOTE — PLAN OF CARE
Problem: Patient Care Overview  Goal: Plan of Care Review  Outcome: Ongoing (interventions implemented as appropriate)    Goal: Individualization and Mutuality  Outcome: Ongoing (interventions implemented as appropriate)    Goal: Discharge Needs Assessment  Outcome: Ongoing (interventions implemented as appropriate)    Goal: Interprofessional Rounds/Family Conf  Outcome: Ongoing (interventions implemented as appropriate)      Problem: Pain, Chronic (Adult)  Goal: Identify Related Risk Factors and Signs and Symptoms  Outcome: Ongoing (interventions implemented as appropriate)    Goal: Acceptable Pain/Comfort Level and Functional Ability  Outcome: Ongoing (interventions implemented as appropriate)      Problem: Renal Failure/Kidney Injury, Acute (Adult)  Goal: Signs and Symptoms of Listed Potential Problems Will be Absent, Minimized or Managed (Renal Failure/Kidney Injury, Acute)  Outcome: Ongoing (interventions implemented as appropriate)

## 2019-07-03 NOTE — PLAN OF CARE
Problem: Patient Care Overview  Goal: Plan of Care Review  Outcome: Ongoing (interventions implemented as appropriate)   07/03/19 4034   Coping/Psychosocial   Plan of Care Reviewed With patient   Plan of Care Review   Progress improving   OTHER   Outcome Summary pt resting comfortably. renal labs improving with IVF.        Problem: Fall Risk (Adult)  Goal: Identify Related Risk Factors and Signs and Symptoms  Outcome: Ongoing (interventions implemented as appropriate)    Goal: Absence of Fall  Outcome: Ongoing (interventions implemented as appropriate)      Problem: Pain, Chronic (Adult)  Goal: Identify Related Risk Factors and Signs and Symptoms  Outcome: Ongoing (interventions implemented as appropriate)    Goal: Acceptable Pain/Comfort Level and Functional Ability  Outcome: Ongoing (interventions implemented as appropriate)      Problem: Renal Failure/Kidney Injury, Acute (Adult)  Goal: Signs and Symptoms of Listed Potential Problems Will be Absent, Minimized or Managed (Renal Failure/Kidney Injury, Acute)  Outcome: Ongoing (interventions implemented as appropriate)

## 2019-07-03 NOTE — H&P
[facility] HOSPITALIST     Luna Cordova MD    CHIEF COMPLAINT:     Abnormal lab work     HISTORY OF PRESENT ILLNESS:    53-year-old female with a chief complaint of abnormal lab work done by her primary internal medicine divider who encouraged her to come to the emergency room for evaluation.  The patient had seen a new internal medicine doctor and had lab work drawn with results showing increased creatinine 2.3.  The patient reports she has never had any issues with her creatinine.  She had recent change in medication of stopping her gabapentin which she takes for chronic pain secondary to memory problems.  She started taking several different types of NSAIDs, not at the same time, but intermittent aspirin, Aleve, ibuprofen.  She has chronic right neck and shoulder pain secondary to multiple surgeries for her cancer.  The patient reports she felt well without any overt complaints.  She does report that she has had increased urinary frequency without dysuria and increased thirst several months.    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  Her most recent surgery was February 2019 with removal of a nodule from the right chest and neck with subsequent radiation.          No past medical history on file.  No past surgical history on file.  No family history on file.  Social History     Tobacco Use   • Smoking status: Not on file   Substance Use Topics   • Alcohol use: Not on file   • Drug use: Not on file       (Not in a hospital admission)  Allergies:  Patient has no known allergies.      There is no immunization history on file for this patient.        REVIEW OF SYSTEMS:     Review of Systems   Genitourinary: Positive for frequency.   All other systems reviewed and are negative.      Vital Signs  Temp:  [97.8 °F (36.6 °C)-97.9 °F (36.6 °C)] 97.8 °F (36.6 °C)  Heart Rate:  [55-74] 55  Resp:  [16]  "16  BP: (155-168)/(81-84) 155/81    Flowsheet Rows      First Filed Value   Admission Height  162.6 cm (64\") Documented at 07/02/2019 1720   Admission Weight  56.6 kg (124 lb 12.5 oz) Documented at 07/02/2019 1720           Physical Exam:    Physical Exam   Constitutional: She is oriented to person, place, and time. She appears well-developed and well-nourished.   HENT:   Head: Normocephalic.   Eyes: Pupils are equal, round, and reactive to light.   Neck: No JVD present. No tracheal deviation present.   There are multiple scars to the neck with history of thyroidectomy, parathyroidectomy, nodule and lymph node removal.  There is evidence of radiation to the neck.   Cardiovascular: Normal rate, regular rhythm, normal heart sounds and intact distal pulses.   Pulmonary/Chest: Effort normal and breath sounds normal.   Abdominal: Bowel sounds are normal.   Musculoskeletal: Normal range of motion.   Neurological: She is alert and oriented to person, place, and time.   Skin: Skin is warm and dry. Capillary refill takes less than 2 seconds.   Psychiatric: She has a normal mood and affect. Her behavior is normal. Judgment and thought content normal.   Vitals reviewed.      Emotional Behavior:    Cooperative    Debilities:  Age appropriate      Results Review:    I reviewed the patient's new clinical results.  Lab Results (most recent)     Procedure Component Value Units Date/Time    Comprehensive Metabolic Panel [[id number]]  (Abnormal) Collected:  07/02/19 1813    Specimen:  Blood Updated:  07/02/19 1906     Glucose 87 mg/dL      BUN 31 mg/dL      Creatinine 2.20 mg/dL      Sodium 138 mmol/L      Potassium 3.1 mmol/L      Chloride 104 mmol/L      CO2 23.0 mmol/L      Calcium 11.8 mg/dL      Total Protein 7.6 g/dL      Albumin 4.20 g/dL      ALT (SGPT) 32 U/L      AST (SGOT) 32 U/L      Alkaline Phosphatase 41 U/L      Total Bilirubin 0.8 mg/dL      eGFR Non African Amer 23 mL/min/1.73      Globulin 3.4 gm/dL      A/G Ratio " 1.2 g/dL      BUN/Creatinine Ratio 14.1     Anion Gap 14.1 mmol/L     Urinalysis With Culture If Indicated - Urine, Clean Catch [[id number]]  (Abnormal) Collected:  07/02/19 1814    Specimen:  Urine, Clean Catch Updated:  07/02/19 1902     Color, UA Yellow     Appearance, UA Clear     pH, UA 6.5     Specific Gravity, UA 1.008     Glucose, UA Negative     Ketones, UA Negative     Bilirubin, UA Negative     Blood, UA Trace     Protein, UA Negative     Leuk Esterase, UA Negative     Nitrite, UA Negative     Urobilinogen, UA 0.2 E.U./dL    Urinalysis, Microscopic Only - Urine, Clean Catch [[id number]]  (Abnormal) Collected:  07/02/19 1814    Specimen:  Urine, Clean Catch Updated:  07/02/19 1902     RBC, UA None Seen /HPF      WBC, UA 0-2 /HPF      Bacteria, UA None Seen /HPF      Squamous Epithelial Cells, UA 3-6 /HPF      Hyaline Casts, UA None Seen /LPF      Methodology Manual Light Microscopy    CBC & Differential [[id number]] Collected:  07/02/19 1813    Specimen:  Blood Updated:  07/02/19 1832    Narrative:       The following orders were created for panel order CBC & Differential.  Procedure                               Abnormality         Status                     ---------                               -----------         ------                     CBC Auto Differential[[id number]]        Abnormal            Final result                 Please view results for these tests on the individual orders.    CBC Auto Differential [[id number]]  (Abnormal) Collected:  07/02/19 1813    Specimen:  Blood Updated:  07/02/19 1832     WBC 6.00 10*3/mm3      RBC 3.63 10*6/mm3      Hemoglobin 11.3 g/dL      Hematocrit 32.2 %      MCV 88.5 fL      MCH 31.0 pg      MCHC 35.0 g/dL      RDW 12.9 %      RDW-SD 40.3 fl      MPV 7.4 fL      Platelets 235 10*3/mm3      Neutrophil % 61.4 %      Lymphocyte % 29.4 %      Monocyte % 5.5 %      Eosinophil % 2.8 %      Basophil % 0.9 %      Neutrophils, Absolute 3.70 10*3/mm3      Lymphocytes,  Absolute 1.80 10*3/mm3      Monocytes, Absolute 0.30 10*3/mm3      Eosinophils, Absolute 0.20 10*3/mm3      Basophils, Absolute 0.10 10*3/mm3      nRBC 0.1 /100 WBC           Imaging Results (most recent)     None        reviewed    ECG/EMG Results (most recent)     Procedure Component Value Units Date/Time    ECG 12 Lead [[id number]] Collected:  07/02/19 1810     Updated:  07/02/19 1811    Narrative:       HEART RATE= 55  bpm  RR Interval= 1092  ms  VT Interval= 154  ms  P Horizontal Axis= 12  deg  P Front Axis= 68  deg  QRSD Interval= 109  ms  QT Interval= 419  ms  QRS Axis= -2  deg  T Wave Axis= 48  deg  - BORDERLINE ECG -  Sinus bradycardia  Borderline ST elevation, anterolateral leads  Electronically Signed By:   Date and Time of Study: 2019-07-02 18:10:32        reviewed              No image results found.      Assessment/Plan       Acute renal failure (CMS/HCC)    Acute renal injury, creatinine 2.2 with comparison creatinine 0.8 in November 2018, uncertain etiology: Bladder scan; hold NSAIDs, Excedrin; IV fluids; repeat BMP    --may benefit form inpatient nephrology consult if creatinine not significantly improved in a.m.    Hypercalcemia, calcium level 11.8 with comparison 10.6 November 2018: Check calcium in a.m.    --History of MENS    Low hemoglobin, 11.3 with comparison 13.9 in November 2018: Repeat CBC    --Note trace hematuria without microscopic evidence of infection or dysuria    Hypokalemia, mild, potassium 3.1 with noted acute kidney injury: Give potassium 40 mEq x 1 and repeat BMP in a.m.    Multiple Endocrine Neoplastic Syndrome with history of thyroid and parathyroid removal: Continue Calcitrol, calcium, levothyroxine    Allergies: Continue Singulair    Rosacea: Continue doxycycline    Hypertension, chronic, controlled: Continue amlodipine    HLD--continue atorvastatin    DVT prophylaxis--heparin    Disposition    Acute renal injury of uncertain etiology.  Will evaluate and treat    I discussed  the patients findings and my recommendations with patient.     ANDRES Alvarado  07/02/19  9:01 PM    Time:

## 2019-07-03 NOTE — PLAN OF CARE
Problem: Fall Risk (Adult)  Goal: Identify Related Risk Factors and Signs and Symptoms  Outcome: Outcome(s) achieved Date Met: 07/03/19    Goal: Absence of Fall  Outcome: Outcome(s) achieved Date Met: 07/03/19

## 2019-07-04 ENCOUNTER — READMISSION MANAGEMENT (OUTPATIENT)
Dept: CALL CENTER | Facility: HOSPITAL | Age: 54
End: 2019-07-04

## 2019-07-04 NOTE — OUTREACH NOTE
Prep Survey      Responses   Facility patient discharged from?  [facility]   Is patient eligible?  Yes   Discharge diagnosis  acute kidney injury   Does the patient have one of the following disease processes/diagnoses(primary or secondary)?  Other   Does the patient have Home health ordered?  No   Is there a DME ordered?  No   Prep survey completed?  Yes          Mary Moya RN

## 2019-07-05 NOTE — PAYOR COMM NOTE
"    Carrie Manzano, RN MSN  /UR  [facility]  [phone] office  [phone] fax  [email or web address] | Anywhere      Lily Lester (53 y.o. Female)   Auth # [id number]      Discharged home on 7/3/2019      Date of Birth Social Security Number Address Home Phone MRN    1965  [address] [place] [address] [phone] [id number]    Oriental orthodox Marital Status          None        Admission Date Admission Type Admitting Provider Attending Provider Department, Room/Bed    7/2/19 Emergency Orlin Ro DO  [facility] 3C MEDICAL INPATIENT, 360/1    Discharge Date Discharge Disposition Discharge Destination        7/3/2019 Home or Self Care Home             Attending Provider:  (none)   Allergies:  No Known Allergies    Isolation:  None   Infection:  None   Code Status:  Prior    Ht:  162.6 cm (64\")   Wt:  57.2 kg (126 lb 1.7 oz)    Admission Cmt:  None   Principal Problem:  None                Active Insurance as of 7/2/2019     Primary Coverage     Payor Plan Insurance Group Employer/Plan Group    [employer] C[employer]NA [id number]     Payor Plan Address Payor Plan Phone Number Payor Plan Fax Number Effective Dates    [id number] [phone]  1/1/2019 - None Entered    [facility] [id number]       Subscriber Name Subscriber Birth Date Member ID       LILY LESTER 1965 [id number]                 Emergency Contacts      (Rel.) Home Phone Work Phone Mobile Phone    CHRISTIANO LESTER (Spouse) -- -- [phone]               Discharge Summary      Orlin Ro DO at 7/3/2019  5:37 PM            Date of Admission: 7/2/2019    Date of Discharge:  7/3/2019    Length of stay:  LOS: 0 days     Admission Diagnosis:PEDRO    Discharge Diagnosis:   Acute kidney injury  -likely NSAID induced nephritis  -resolving on IVF (1.8 at d/c)  -recheck BMP with primary  -stop NSAIDs     Hypercalcemia,  -resolving  -likely combo of PEDRO and calcium supplements "       Hypokalemia,   -resolved     Multiple Endocrine Neoplastic Syndrome with history of thyroid and parathyroid removal:   -Continue Calcitrol, calcium, levothyroxine     Allergies:   -Continue Singulair     Rosacea:   -Continue doxycycline     Hypertension,   -chronic, controlled: Continue amlodipine        Hospital Course  Patient is a 53 y.o. female presented with acute kidney injury.  She had been taking increased amounts of NSAIDs for shoulder pain.  Creatinine was found to be 2.3 she was started on IV fluids with improvement to 1.8 by the time of discharge.  She was wanting to go home and as she was having adequate urine output and felt stable to discharge home with outpatient follow-up for BMP testing.    Past Medical History:     Past Medical History:   Diagnosis Date   • Arthritis    • Cancer (CMS/HCC)    • Elevated cholesterol    • History of transfusion    • Hypertension        Past Surgical History:     Past Surgical History:   Procedure Laterality Date   • ABDOMINAL SURGERY     • COLONOSCOPY     • SKIN BIOPSY     • TONSILLECTOMY         Social History:   Social History     Socioeconomic History   • Marital status:      Spouse name: Not on file   • Number of children: Not on file   • Years of education: Not on file   • Highest education level: Not on file   Tobacco Use   • Smoking status: Former Smoker     Packs/day: 0.25     Years: 10.00     Pack years: 2.50     Types: Cigarettes   • Smokeless tobacco: Never Used   Substance and Sexual Activity   • Alcohol use: No     Frequency: Never   • Drug use: No   • Sexual activity: Defer       Procedures Performed:none         Consults:   Consults     Date and Time Order Name Status Description    7/2/2019 1931 Hospitalist (on-call MD unless specified) Completed           Pertinent Test Results:     Lab Results (last 72 hours)     Procedure Component Value Units Date/Time    Basic Metabolic Panel [[id number]]  (Abnormal) Collected:  07/03/19 9612     Specimen:  Blood Updated:  07/03/19 1652     Glucose 86 mg/dL      BUN 27 mg/dL      Creatinine 1.80 mg/dL      Sodium 140 mmol/L      Potassium 4.4 mmol/L      Comment: Result checked         Chloride 110 mmol/L      CO2 21.0 mmol/L      Calcium 9.8 mg/dL      eGFR Non African Amer 29 mL/min/1.73      BUN/Creatinine Ratio 15.0     Anion Gap 13.4 mmol/L     Basic Metabolic Panel [[id number]]  (Abnormal) Collected:  07/03/19 0243    Specimen:  Blood Updated:  07/03/19 0354     Glucose 78 mg/dL      BUN 27 mg/dL      Creatinine 1.90 mg/dL      Sodium 143 mmol/L      Potassium 3.1 mmol/L      Chloride 111 mmol/L      CO2 23.0 mmol/L      Calcium 10.1 mg/dL      eGFR Non African Amer 27 mL/min/1.73      BUN/Creatinine Ratio 14.2     Anion Gap 12.1 mmol/L     Lipid Panel [[id number]]  (Abnormal) Collected:  07/03/19 0243    Specimen:  Blood Updated:  07/03/19 0354     Total Cholesterol 158 mg/dL      Triglycerides 167 mg/dL      HDL Cholesterol 42 mg/dL      LDL Cholesterol  99 mg/dL      VLDL Cholesterol 33.4 mg/dL      LDL/HDL Ratio 1.97     Chol/HDL Ratio 3.76    Narrative:       The following guidelines have been recommended by the NCEP for Total Cholesterol, Total Triglycerides, LDL Cholesterol, and HDL Cholesterols    Total Cholesterol  Desirable:        <200 mg/dL  Borderline High:  200-239 mg/dL  High:             > or = 240 mg/dL    Total Triglyceride  Normal:           <150 mg/dL  Borderline High:  150-199 mg/dL  High:             200-499 mg/dL  Very High:        > or = 500 mg/dL    HDL Cholesterol  Low HDL:          <40 mg/dL  Normal:           40-60 mg/dL  Desirable:        >60 mg/dL    LDL Cholesterol  Optimal:          <100 mg/dL  Low Risk:         100-129 mg/dL  Borderline High:  130-159 mg/dL  High:             160-189 mg/dL  Very High:        > or = 190 mg/dL    The following ratios of LDL to HDL and Total cholesterol to HDL are for information only:    LDL/HDL Ratio  Desirable:        <5  Optimal:           < or = 3.5    Total Cholesterol/HDL Ratio  Low Risk:         3.3-4.4  Average Risk:     4.4-7.1  Medium Risk:      7.1-11  High Risk:        >11       TSH [[id number]]  (Abnormal) Collected:  07/03/19 0243    Specimen:  Blood Updated:  07/03/19 0354     TSH 18.540 mIU/mL      Comment: Results may be falsely decreased if patient taking Biotin.       Phosphorus [[id number]]  (Normal) Collected:  07/03/19 0243    Specimen:  Blood Updated:  07/03/19 0354     Phosphorus 2.8 mg/dL     Magnesium [[id number]]  (Abnormal) Collected:  07/03/19 0243    Specimen:  Blood Updated:  07/03/19 0354     Magnesium 1.6 mg/dL     CBC Auto Differential [[id number]]  (Abnormal) Collected:  07/03/19 0243    Specimen:  Blood Updated:  07/03/19 0313     WBC 6.00 10*3/mm3      RBC 3.34 10*6/mm3      Hemoglobin 10.5 g/dL      Hematocrit 29.8 %      MCV 89.4 fL      MCH 31.4 pg      MCHC 35.1 g/dL      RDW 13.1 %      RDW-SD 41.1 fl      MPV 7.8 fL      Platelets 209 10*3/mm3      Neutrophil % 49.0 %      Lymphocyte % 40.1 %      Monocyte % 6.5 %      Eosinophil % 3.7 %      Basophil % 0.7 %      Neutrophils, Absolute 3.00 10*3/mm3      Lymphocytes, Absolute 2.40 10*3/mm3      Monocytes, Absolute 0.40 10*3/mm3      Eosinophils, Absolute 0.20 10*3/mm3      Basophils, Absolute 0.00 10*3/mm3      nRBC 0.1 /100 WBC     Comprehensive Metabolic Panel [[id number]]  (Abnormal) Collected:  07/02/19 1813    Specimen:  Blood Updated:  07/02/19 1906     Glucose 87 mg/dL      BUN 31 mg/dL      Creatinine 2.20 mg/dL      Sodium 138 mmol/L      Potassium 3.1 mmol/L      Chloride 104 mmol/L      CO2 23.0 mmol/L      Calcium 11.8 mg/dL      Total Protein 7.6 g/dL      Albumin 4.20 g/dL      ALT (SGPT) 32 U/L      AST (SGOT) 32 U/L      Alkaline Phosphatase 41 U/L      Total Bilirubin 0.8 mg/dL      eGFR Non African Amer 23 mL/min/1.73      Globulin 3.4 gm/dL      A/G Ratio 1.2 g/dL      BUN/Creatinine Ratio 14.1     Anion Gap 14.1 mmol/L     Urinalysis With Culture  If Indicated - Urine, Clean Catch [[id number]]  (Abnormal) Collected:  07/02/19 1814    Specimen:  Urine, Clean Catch Updated:  07/02/19 1902     Color, UA Yellow     Appearance, UA Clear     pH, UA 6.5     Specific Gravity, UA 1.008     Glucose, UA Negative     Ketones, UA Negative     Bilirubin, UA Negative     Blood, UA Trace     Protein, UA Negative     Leuk Esterase, UA Negative     Nitrite, UA Negative     Urobilinogen, UA 0.2 E.U./dL    Urinalysis, Microscopic Only - Urine, Clean Catch [[id number]]  (Abnormal) Collected:  07/02/19 1814    Specimen:  Urine, Clean Catch Updated:  07/02/19 1902     RBC, UA None Seen /HPF      WBC, UA 0-2 /HPF      Bacteria, UA None Seen /HPF      Squamous Epithelial Cells, UA 3-6 /HPF      Hyaline Casts, UA None Seen /LPF      Methodology Manual Light Microscopy    CBC & Differential [[id number]] Collected:  07/02/19 1813    Specimen:  Blood Updated:  07/02/19 1832    Narrative:       The following orders were created for panel order CBC & Differential.  Procedure                               Abnormality         Status                     ---------                               -----------         ------                     CBC Auto Differential[[id number]]        Abnormal            Final result                 Please view results for these tests on the individual orders.    CBC Auto Differential [[id number]]  (Abnormal) Collected:  07/02/19 1813    Specimen:  Blood Updated:  07/02/19 1832     WBC 6.00 10*3/mm3      RBC 3.63 10*6/mm3      Hemoglobin 11.3 g/dL      Hematocrit 32.2 %      MCV 88.5 fL      MCH 31.0 pg      MCHC 35.0 g/dL      RDW 12.9 %      RDW-SD 40.3 fl      MPV 7.4 fL      Platelets 235 10*3/mm3      Neutrophil % 61.4 %      Lymphocyte % 29.4 %      Monocyte % 5.5 %      Eosinophil % 2.8 %      Basophil % 0.9 %      Neutrophils, Absolute 3.70 10*3/mm3      Lymphocytes, Absolute 1.80 10*3/mm3      Monocytes, Absolute 0.30 10*3/mm3      Eosinophils, Absolute 0.20  10*3/mm3      Basophils, Absolute 0.10 10*3/mm3      nRBC 0.1 /100 WBC              No results found for: BLOODCX   No results found for: URINECX   No results found for: WOUNDCX   No results found for: RESPCX   No results found for: STOOLCX   No results found for: STOOLCXY   No results found for: MRSACX   No results found for: VRECX   No results found for: CRECX   No components found for: AFBSTAINCX   No results found for: AFBCX   No results found for: AFBCXBLD   No results found for: FUNGUSCX   No components found for: GMSSTAIN   No results found for: KOHPREP   No results found for: ANACX   No results found for: BODYFLDCX   No results found for: CSFCX   No results found for: CULTURE   No results found for: THROATCX   No results found for: THROATCXBS   No results found for: ICECX   No results found for: DICECX   No results found for: GCCX           Imaging Results (all)     None            Condition on Discharge:  Stable     Vital Signs  Temp:  [97.3 °F (36.3 °C)-98.1 °F (36.7 °C)] 98.1 °F (36.7 °C)  Heart Rate:  [51-56] 56  Resp:  [16-17] 17  BP: (109-162)/(71-82) 124/79    Physical Exam:  Physical Exam   Constitutional: She is oriented to person, place, and time. She appears well-developed.   Neck: Normal range of motion.   Cardiovascular: Regular rhythm.   Pulmonary/Chest: Breath sounds normal.   Abdominal: Soft. Bowel sounds are normal.   Neurological: She is alert and oriented to person, place, and time.   Skin: Skin is dry.       Discharge Disposition  Home or Self Care    Discharge Medications     Discharge Medications      Continue These Medications      Instructions Start Date   amLODIPine 5 MG tablet  Commonly known as:  NORVASC   5 mg, Oral, Daily      aspirin-acetaminophen-caffeine 250-250-65 MG per tablet  Commonly known as:  EXCEDRIN MIGRAINE   2 tablets, Oral, Daily PRN      atorvastatin 10 MG tablet  Commonly known as:  LIPITOR   10 mg, Oral, Daily, Mon-Sat       calcitriol 0.25 MCG capsule  Commonly  known as:  ROCALTROL   0.25 mcg, Oral, 2 Times Daily      calcium carbonate 600 MG tablet  Commonly known as:  OS-DORIAN   4,000 mg, Oral, Daily      doxycycline 50 MG capsule  Commonly known as:  VIBRAMYCIN   50 mg, Oral, 2 Times Daily      levothyroxine 125 MCG tablet  Commonly known as:  SYNTHROID, LEVOTHROID   125 mcg, Oral, Daily      montelukast 10 MG tablet  Commonly known as:  SINGULAIR   10 mg, Oral, Nightly         Stop These Medications    naproxen sodium 220 MG tablet  Commonly known as:  ALEVE            Discharge Diet:   Diet Instructions     Diet: Cardiac      Discharge Diet:  Cardiac          Activity at Discharge:   Activity Instructions     Activity as Tolerated            Follow-up Appointments  No future appointments.  Additional Instructions for the Follow-ups that You Need to Schedule     Discharge Follow-up with PCP   As directed       Currently Documented PCP:    Luna Cordova MD    PCP Phone Number:    [phone]     Follow Up Details:  primary one week               Test Results Pending at Discharge       Risk for Readmission (LACE) Score: 1 (7/3/2019  6:00 AM)          Orlin Ro DO  07/03/19  5:40 PM                Electronically signed by Orlin Ro DO at 7/3/2019  5:48 PM

## 2019-07-08 ENCOUNTER — READMISSION MANAGEMENT (OUTPATIENT)
Dept: CALL CENTER | Facility: HOSPITAL | Age: 54
End: 2019-07-08

## 2019-07-08 NOTE — OUTREACH NOTE
Medical Week 1 Survey      Responses   Facility patient discharged from?  [facility]   Does the patient have one of the following disease processes/diagnoses(primary or secondary)?  Other   Is there a successful TCM telephone encounter documented?  No   Week 1 attempt successful?  No   Rescheduled  Revoked   Revoke  Decline to participate [Left voicemail]          Edith Riddle LPN     Patient returned call states she is doing well and has follow up with her PCP.  No questions at this time

## 2019-07-23 ENCOUNTER — HOSPITAL ENCOUNTER (OUTPATIENT)
Facility: HOSPITAL | Age: 54
Setting detail: OBSERVATION
Discharge: HOME | End: 2019-07-25
Attending: EMERGENCY MEDICINE | Admitting: HOSPITALIST

## 2019-07-23 ENCOUNTER — APPOINTMENT (OUTPATIENT)
Dept: GENERAL RADIOLOGY | Facility: HOSPITAL | Age: 54
End: 2019-07-23

## 2019-07-23 DIAGNOSIS — R06.00 DYSPNEA, UNSPECIFIED TYPE: Primary | ICD-10-CM

## 2019-07-23 LAB
ANION GAP SERPL CALCULATED.3IONS-SCNC: 16.9 MMOL/L (ref 5–15)
BASOPHILS # BLD AUTO: 0.1 10*3/MM3 (ref 0–0.2)
BASOPHILS NFR BLD AUTO: 0.9 % (ref 0–1.5)
BUN BLD-MCNC: 22 MG/DL (ref 8–20)
BUN/CREAT SERPL: 16.9 (ref 5.4–26.2)
CALCIUM SPEC-SCNC: 9.4 MG/DL (ref 8.9–10.3)
CHLORIDE SERPL-SCNC: 106 MMOL/L (ref 101–111)
CO2 SERPL-SCNC: 22 MMOL/L (ref 22–32)
CREAT BLD-MCNC: 1.3 MG/DL (ref 0.4–1)
D DIMER PPP FEU-MCNC: 0.24 MCGFEU/ML (ref 0.17–0.59)
DEPRECATED RDW RBC AUTO: 39.8 FL (ref 37–54)
EOSINOPHIL # BLD AUTO: 0.2 10*3/MM3 (ref 0–0.4)
EOSINOPHIL NFR BLD AUTO: 2.7 % (ref 0.3–6.2)
ERYTHROCYTE [DISTWIDTH] IN BLOOD BY AUTOMATED COUNT: 12.7 % (ref 12.3–15.4)
GFR SERPL CREATININE-BSD FRML MDRD: 43 ML/MIN/1.73
GLUCOSE BLD-MCNC: 172 MG/DL (ref 65–99)
HCT VFR BLD AUTO: 34.4 % (ref 34–46.6)
HGB BLD-MCNC: 11.9 G/DL (ref 12–15.9)
LYMPHOCYTES # BLD AUTO: 2 10*3/MM3 (ref 0.7–3.1)
LYMPHOCYTES NFR BLD AUTO: 28.9 % (ref 19.6–45.3)
MCH RBC QN AUTO: 30.8 PG (ref 26.6–33)
MCHC RBC AUTO-ENTMCNC: 34.5 G/DL (ref 31.5–35.7)
MCV RBC AUTO: 89.4 FL (ref 79–97)
MONOCYTES # BLD AUTO: 0.4 10*3/MM3 (ref 0.1–0.9)
MONOCYTES NFR BLD AUTO: 5.7 % (ref 5–12)
NEUTROPHILS # BLD AUTO: 4.3 10*3/MM3 (ref 1.7–7)
NEUTROPHILS NFR BLD AUTO: 61.8 % (ref 42.7–76)
NRBC BLD AUTO-RTO: 0.1 /100 WBC (ref 0–0.2)
PLATELET # BLD AUTO: 243 10*3/MM3 (ref 140–450)
PMV BLD AUTO: 7.6 FL (ref 6–12)
POTASSIUM BLD-SCNC: 3.9 MMOL/L (ref 3.6–5.1)
RBC # BLD AUTO: 3.85 10*6/MM3 (ref 3.77–5.28)
SODIUM BLD-SCNC: 141 MMOL/L (ref 136–144)
TROPONIN I SERPL-MCNC: <0.03 NG/ML (ref 0–0.03)
TROPONIN I SERPL-MCNC: <0.03 NG/ML (ref 0–0.03)
WBC NRBC COR # BLD: 7 10*3/MM3 (ref 3.4–10.8)

## 2019-07-23 PROCEDURE — 99219 PR INITIAL OBSERVATION CARE/DAY 50 MINUTES: CPT | Performed by: PHYSICIAN ASSISTANT

## 2019-07-23 PROCEDURE — 80048 BASIC METABOLIC PNL TOTAL CA: CPT | Performed by: EMERGENCY MEDICINE

## 2019-07-23 PROCEDURE — 96360 HYDRATION IV INFUSION INIT: CPT

## 2019-07-23 PROCEDURE — 85379 FIBRIN DEGRADATION QUANT: CPT | Performed by: EMERGENCY MEDICINE

## 2019-07-23 PROCEDURE — G0378 HOSPITAL OBSERVATION PER HR: HCPCS

## 2019-07-23 PROCEDURE — 84484 ASSAY OF TROPONIN QUANT: CPT | Performed by: PHYSICIAN ASSISTANT

## 2019-07-23 PROCEDURE — 96361 HYDRATE IV INFUSION ADD-ON: CPT

## 2019-07-23 PROCEDURE — 85025 COMPLETE CBC W/AUTO DIFF WBC: CPT | Performed by: EMERGENCY MEDICINE

## 2019-07-23 PROCEDURE — 93005 ELECTROCARDIOGRAM TRACING: CPT | Performed by: EMERGENCY MEDICINE

## 2019-07-23 PROCEDURE — 99284 EMERGENCY DEPT VISIT MOD MDM: CPT

## 2019-07-23 PROCEDURE — 84484 ASSAY OF TROPONIN QUANT: CPT | Performed by: EMERGENCY MEDICINE

## 2019-07-23 PROCEDURE — 71045 X-RAY EXAM CHEST 1 VIEW: CPT

## 2019-07-23 RX ORDER — DOCUSATE SODIUM 100 MG/1
100 CAPSULE, LIQUID FILLED ORAL 2 TIMES DAILY PRN
Status: DISCONTINUED | OUTPATIENT
Start: 2019-07-23 | End: 2019-07-25 | Stop reason: HOSPADM

## 2019-07-23 RX ORDER — CALCIUM CARBONATE 200(500)MG
2 TABLET,CHEWABLE ORAL 2 TIMES DAILY PRN
Status: DISCONTINUED | OUTPATIENT
Start: 2019-07-23 | End: 2019-07-25 | Stop reason: HOSPADM

## 2019-07-23 RX ORDER — ALUMINA, MAGNESIA, AND SIMETHICONE 2400; 2400; 240 MG/30ML; MG/30ML; MG/30ML
15 SUSPENSION ORAL EVERY 6 HOURS PRN
Status: DISCONTINUED | OUTPATIENT
Start: 2019-07-23 | End: 2019-07-25 | Stop reason: HOSPADM

## 2019-07-23 RX ORDER — AMLODIPINE BESYLATE 5 MG/1
5 TABLET ORAL DAILY
Status: DISCONTINUED | OUTPATIENT
Start: 2019-07-24 | End: 2019-07-25 | Stop reason: HOSPADM

## 2019-07-23 RX ORDER — LEVOTHYROXINE SODIUM 0.12 MG/1
125 TABLET ORAL
Status: DISCONTINUED | OUTPATIENT
Start: 2019-07-24 | End: 2019-07-24

## 2019-07-23 RX ORDER — CHOLECALCIFEROL (VITAMIN D3) 125 MCG
5 CAPSULE ORAL NIGHTLY PRN
Status: DISCONTINUED | OUTPATIENT
Start: 2019-07-23 | End: 2019-07-25 | Stop reason: HOSPADM

## 2019-07-23 RX ORDER — SODIUM CHLORIDE 0.9 % (FLUSH) 0.9 %
3-10 SYRINGE (ML) INJECTION AS NEEDED
Status: DISCONTINUED | OUTPATIENT
Start: 2019-07-23 | End: 2019-07-25 | Stop reason: HOSPADM

## 2019-07-23 RX ORDER — SODIUM CHLORIDE 0.9 % (FLUSH) 0.9 %
3 SYRINGE (ML) INJECTION EVERY 12 HOURS SCHEDULED
Status: DISCONTINUED | OUTPATIENT
Start: 2019-07-23 | End: 2019-07-25 | Stop reason: HOSPADM

## 2019-07-23 RX ORDER — ACETAMINOPHEN 325 MG/1
650 TABLET ORAL EVERY 4 HOURS PRN
Status: DISCONTINUED | OUTPATIENT
Start: 2019-07-23 | End: 2019-07-25 | Stop reason: HOSPADM

## 2019-07-23 RX ORDER — CALCITRIOL 0.25 UG/1
0.25 CAPSULE, LIQUID FILLED ORAL DAILY
Status: DISCONTINUED | OUTPATIENT
Start: 2019-07-24 | End: 2019-07-25 | Stop reason: HOSPADM

## 2019-07-23 RX ORDER — ONDANSETRON 2 MG/ML
4 INJECTION INTRAMUSCULAR; INTRAVENOUS EVERY 6 HOURS PRN
Status: DISCONTINUED | OUTPATIENT
Start: 2019-07-23 | End: 2019-07-25 | Stop reason: HOSPADM

## 2019-07-23 RX ORDER — SODIUM CHLORIDE 9 MG/ML
100 INJECTION, SOLUTION INTRAVENOUS CONTINUOUS
Status: DISCONTINUED | OUTPATIENT
Start: 2019-07-23 | End: 2019-07-25 | Stop reason: HOSPADM

## 2019-07-23 RX ORDER — DOXYCYCLINE 100 MG/1
50 TABLET ORAL EVERY 12 HOURS SCHEDULED
Status: DISCONTINUED | OUTPATIENT
Start: 2019-07-23 | End: 2019-07-25 | Stop reason: HOSPADM

## 2019-07-23 RX ORDER — BISACODYL 10 MG
10 SUPPOSITORY, RECTAL RECTAL DAILY PRN
Status: DISCONTINUED | OUTPATIENT
Start: 2019-07-23 | End: 2019-07-25 | Stop reason: HOSPADM

## 2019-07-23 RX ORDER — SODIUM CHLORIDE 0.9 % (FLUSH) 0.9 %
10 SYRINGE (ML) INJECTION AS NEEDED
Status: DISCONTINUED | OUTPATIENT
Start: 2019-07-23 | End: 2019-07-25 | Stop reason: HOSPADM

## 2019-07-23 RX ORDER — ACETAMINOPHEN 650 MG/1
650 SUPPOSITORY RECTAL EVERY 4 HOURS PRN
Status: DISCONTINUED | OUTPATIENT
Start: 2019-07-23 | End: 2019-07-25 | Stop reason: HOSPADM

## 2019-07-23 RX ORDER — ONDANSETRON 4 MG/1
4 TABLET, FILM COATED ORAL EVERY 6 HOURS PRN
Status: DISCONTINUED | OUTPATIENT
Start: 2019-07-23 | End: 2019-07-25 | Stop reason: HOSPADM

## 2019-07-23 RX ORDER — MONTELUKAST SODIUM 10 MG/1
10 TABLET ORAL NIGHTLY
Status: DISCONTINUED | OUTPATIENT
Start: 2019-07-23 | End: 2019-07-25 | Stop reason: HOSPADM

## 2019-07-23 RX ADMIN — SODIUM CHLORIDE 100 ML/HR: 900 INJECTION, SOLUTION INTRAVENOUS at 22:11

## 2019-07-23 RX ADMIN — Medication 3 ML: at 22:11

## 2019-07-24 ENCOUNTER — APPOINTMENT (OUTPATIENT)
Dept: NUCLEAR MEDICINE | Facility: HOSPITAL | Age: 54
End: 2019-07-24

## 2019-07-24 PROBLEM — E89.0 POSTOPERATIVE HYPOTHYROIDISM: Status: ACTIVE | Noted: 2019-07-24

## 2019-07-24 PROBLEM — E34.9 ELEVATED CALCITONIN LEVEL: Status: ACTIVE | Noted: 2018-02-16

## 2019-07-24 PROBLEM — E03.9 HYPOTHYROIDISM: Status: ACTIVE | Noted: 2019-07-24

## 2019-07-24 LAB
ANION GAP SERPL CALCULATED.3IONS-SCNC: 9.8 MMOL/L (ref 5–15)
BASOPHILS # BLD AUTO: 0 10*3/MM3 (ref 0–0.2)
BASOPHILS NFR BLD AUTO: 0.7 % (ref 0–1.5)
BH CV NUCLEAR PRIOR STUDY: 3
BH CV STRESS BP STAGE 1: NORMAL
BH CV STRESS BP STAGE 2: NORMAL
BH CV STRESS BP STAGE 3: NORMAL
BH CV STRESS BP STAGE 4: NORMAL
BH CV STRESS COMMENTS STAGE 1: NORMAL
BH CV STRESS COMMENTS STAGE 2: NORMAL
BH CV STRESS DOSE REGADENOSON STAGE 1: 0.4
BH CV STRESS DURATION MIN STAGE 1: 0
BH CV STRESS DURATION MIN STAGE 2: 4
BH CV STRESS DURATION SEC STAGE 1: 10
BH CV STRESS DURATION SEC STAGE 2: 0
BH CV STRESS HR STAGE 1: 80
BH CV STRESS HR STAGE 2: 101
BH CV STRESS HR STAGE 3: 98
BH CV STRESS HR STAGE 4: 95
BH CV STRESS PROTOCOL 1: NORMAL
BH CV STRESS RECOVERY BP: NORMAL MMHG
BH CV STRESS RECOVERY HR: 83 BPM
BH CV STRESS STAGE 1: 1
BH CV STRESS STAGE 2: 2
BH CV STRESS STAGE 3: 3
BH CV STRESS STAGE 4: 4
BUN BLD-MCNC: 23 MG/DL (ref 8–20)
BUN/CREAT SERPL: 17.7 (ref 5.4–26.2)
CALCIUM SPEC-SCNC: 8.4 MG/DL (ref 8.9–10.3)
CHLORIDE SERPL-SCNC: 111 MMOL/L (ref 101–111)
CO2 SERPL-SCNC: 23 MMOL/L (ref 22–32)
CREAT BLD-MCNC: 1.3 MG/DL (ref 0.4–1)
DEPRECATED RDW RBC AUTO: 40.7 FL (ref 37–54)
EOSINOPHIL # BLD AUTO: 0.2 10*3/MM3 (ref 0–0.4)
EOSINOPHIL NFR BLD AUTO: 3.7 % (ref 0.3–6.2)
ERYTHROCYTE [DISTWIDTH] IN BLOOD BY AUTOMATED COUNT: 13 % (ref 12.3–15.4)
GFR SERPL CREATININE-BSD FRML MDRD: 43 ML/MIN/1.73
GLUCOSE BLD-MCNC: 109 MG/DL (ref 65–99)
HCT VFR BLD AUTO: 28.9 % (ref 34–46.6)
HCT VFR BLD AUTO: 32.7 % (ref 34–46.6)
HGB BLD-MCNC: 11.1 G/DL (ref 12–15.9)
HGB BLD-MCNC: 9.9 G/DL (ref 12–15.9)
LV EF NUC BP: 74 %
LYMPHOCYTES # BLD AUTO: 2.3 10*3/MM3 (ref 0.7–3.1)
LYMPHOCYTES NFR BLD AUTO: 36.4 % (ref 19.6–45.3)
MAXIMAL PREDICTED HEART RATE: 165 BPM
MCH RBC QN AUTO: 30.2 PG (ref 26.6–33)
MCHC RBC AUTO-ENTMCNC: 34.2 G/DL (ref 31.5–35.7)
MCV RBC AUTO: 88.3 FL (ref 79–97)
MONOCYTES # BLD AUTO: 0.5 10*3/MM3 (ref 0.1–0.9)
MONOCYTES NFR BLD AUTO: 8.2 % (ref 5–12)
NEUTROPHILS # BLD AUTO: 3.2 10*3/MM3 (ref 1.7–7)
NEUTROPHILS NFR BLD AUTO: 51 % (ref 42.7–76)
NRBC BLD AUTO-RTO: 0 /100 WBC (ref 0–0.2)
PERCENT MAX PREDICTED HR: 61.21 %
PLATELET # BLD AUTO: 201 10*3/MM3 (ref 140–450)
PMV BLD AUTO: 7.6 FL (ref 6–12)
POTASSIUM BLD-SCNC: 3.8 MMOL/L (ref 3.6–5.1)
RBC # BLD AUTO: 3.27 10*6/MM3 (ref 3.77–5.28)
SODIUM BLD-SCNC: 140 MMOL/L (ref 136–144)
STRESS BASELINE BP: NORMAL MMHG
STRESS BASELINE HR: 68 BPM
STRESS PERCENT HR: 72 %
STRESS POST PEAK BP: NORMAL MMHG
STRESS POST PEAK HR: 101 BPM
STRESS TARGET HR: 140 BPM
T3FREE SERPL-MCNC: 3.33 PG/ML (ref 2.39–6.79)
T4 FREE SERPL-MCNC: 1.83 NG/DL (ref 0.58–1.64)
TROPONIN I SERPL-MCNC: <0.03 NG/ML (ref 0–0.03)
TROPONIN I SERPL-MCNC: <0.03 NG/ML (ref 0–0.03)
TSH SERPL DL<=0.05 MIU/L-ACNC: 0.15 MIU/ML (ref 0.34–5.6)
WBC NRBC COR # BLD: 6.2 10*3/MM3 (ref 3.4–10.8)

## 2019-07-24 PROCEDURE — G0378 HOSPITAL OBSERVATION PER HR: HCPCS

## 2019-07-24 PROCEDURE — 78452 HT MUSCLE IMAGE SPECT MULT: CPT | Performed by: INTERNAL MEDICINE

## 2019-07-24 PROCEDURE — 99254 IP/OBS CNSLTJ NEW/EST MOD 60: CPT | Performed by: INTERNAL MEDICINE

## 2019-07-24 PROCEDURE — 78452 HT MUSCLE IMAGE SPECT MULT: CPT

## 2019-07-24 PROCEDURE — 93016 CV STRESS TEST SUPVJ ONLY: CPT | Performed by: INTERNAL MEDICINE

## 2019-07-24 PROCEDURE — 80048 BASIC METABOLIC PNL TOTAL CA: CPT | Performed by: PHYSICIAN ASSISTANT

## 2019-07-24 PROCEDURE — 85014 HEMATOCRIT: CPT | Performed by: PHYSICIAN ASSISTANT

## 2019-07-24 PROCEDURE — 84484 ASSAY OF TROPONIN QUANT: CPT | Performed by: PHYSICIAN ASSISTANT

## 2019-07-24 PROCEDURE — A9500 TC99M SESTAMIBI: HCPCS | Performed by: HOSPITALIST

## 2019-07-24 PROCEDURE — 93018 CV STRESS TEST I&R ONLY: CPT | Performed by: INTERNAL MEDICINE

## 2019-07-24 PROCEDURE — 0 TECHNETIUM SESTAMIBI: Performed by: HOSPITALIST

## 2019-07-24 PROCEDURE — 84439 ASSAY OF FREE THYROXINE: CPT | Performed by: HOSPITALIST

## 2019-07-24 PROCEDURE — 25010000002 REGADENOSON 0.4 MG/5ML SOLUTION: Performed by: HOSPITALIST

## 2019-07-24 PROCEDURE — 93017 CV STRESS TEST TRACING ONLY: CPT

## 2019-07-24 PROCEDURE — 85018 HEMOGLOBIN: CPT | Performed by: PHYSICIAN ASSISTANT

## 2019-07-24 PROCEDURE — 99225 PR SBSQ OBSERVATION CARE/DAY 25 MINUTES: CPT | Performed by: HOSPITALIST

## 2019-07-24 PROCEDURE — 85025 COMPLETE CBC W/AUTO DIFF WBC: CPT | Performed by: PHYSICIAN ASSISTANT

## 2019-07-24 PROCEDURE — 84481 FREE ASSAY (FT-3): CPT | Performed by: HOSPITALIST

## 2019-07-24 PROCEDURE — 96361 HYDRATE IV INFUSION ADD-ON: CPT

## 2019-07-24 PROCEDURE — 84443 ASSAY THYROID STIM HORMONE: CPT | Performed by: PHYSICIAN ASSISTANT

## 2019-07-24 RX ORDER — FAMOTIDINE 20 MG/1
20 TABLET, FILM COATED ORAL 2 TIMES DAILY
Qty: 30 TABLET | Refills: 0 | Status: SHIPPED | OUTPATIENT
Start: 2019-07-24

## 2019-07-24 RX ORDER — LEVOTHYROXINE SODIUM 0.12 MG/1
125 TABLET ORAL
Status: DISCONTINUED | OUTPATIENT
Start: 2019-07-25 | End: 2019-07-24

## 2019-07-24 RX ADMIN — TECHNETIUM TC 99M SESTAMIBI 1 DOSE: 1 INJECTION INTRAVENOUS at 09:43

## 2019-07-24 RX ADMIN — DOXYCYCLINE 50 MG: 100 TABLET, FILM COATED ORAL at 11:00

## 2019-07-24 RX ADMIN — MONTELUKAST SODIUM 10 MG: 10 TABLET, COATED ORAL at 22:23

## 2019-07-24 RX ADMIN — SODIUM CHLORIDE 100 ML/HR: 900 INJECTION, SOLUTION INTRAVENOUS at 07:24

## 2019-07-24 RX ADMIN — TECHNETIUM TC 99M SESTAMIBI 1 DOSE: 1 INJECTION INTRAVENOUS at 06:40

## 2019-07-24 RX ADMIN — REGADENOSON 0.4 MG: 0.08 INJECTION, SOLUTION INTRAVENOUS at 09:43

## 2019-07-24 RX ADMIN — DOXYCYCLINE: 100 TABLET, FILM COATED ORAL at 22:23

## 2019-07-24 RX ADMIN — ACETAMINOPHEN 650 MG: 325 TABLET, FILM COATED ORAL at 11:00

## 2019-07-24 RX ADMIN — CALCITRIOL 0.25 MCG: 0.25 CAPSULE, LIQUID FILLED ORAL at 11:00

## 2019-07-24 RX ADMIN — AMLODIPINE BESYLATE 5 MG: 5 TABLET ORAL at 11:00

## 2019-07-24 RX ADMIN — LEVOTHYROXINE SODIUM 125 MCG: 125 TABLET ORAL at 06:03

## 2019-07-24 RX ADMIN — Medication 3 ML: at 11:04

## 2019-07-24 RX ADMIN — Medication: at 22:24

## 2019-07-25 ENCOUNTER — HOSPITAL ENCOUNTER (OUTPATIENT)
Dept: CARDIOLOGY | Facility: HOSPITAL | Age: 54
Setting detail: OBSERVATION
Discharge: HOME | End: 2019-07-25

## 2019-07-25 VITALS
HEIGHT: 64 IN | DIASTOLIC BLOOD PRESSURE: 68 MMHG | BODY MASS INDEX: 22.2 KG/M2 | SYSTOLIC BLOOD PRESSURE: 106 MMHG | WEIGHT: 130 LBS

## 2019-07-25 VITALS
OXYGEN SATURATION: 99 % | DIASTOLIC BLOOD PRESSURE: 70 MMHG | BODY MASS INDEX: 22.28 KG/M2 | RESPIRATION RATE: 12 BRPM | WEIGHT: 130.51 LBS | HEIGHT: 64 IN | HEART RATE: 61 BPM | TEMPERATURE: 97.6 F | SYSTOLIC BLOOD PRESSURE: 107 MMHG

## 2019-07-25 LAB
ANION GAP SERPL CALCULATED.3IONS-SCNC: 11.7 MMOL/L (ref 5–15)
BASOPHILS # BLD AUTO: 0.1 10*3/MM3 (ref 0–0.2)
BASOPHILS NFR BLD AUTO: 0.9 % (ref 0–1.5)
BH CV ECHO MEAS - % IVS THICK: 55 %
BH CV ECHO MEAS - % LVPW THICK: 58.7 %
BH CV ECHO MEAS - ACS: 1.7 CM
BH CV ECHO MEAS - AO MAX PG (FULL): 3.7 MMHG
BH CV ECHO MEAS - AO MAX PG: 8.5 MMHG
BH CV ECHO MEAS - AO MEAN PG (FULL): 2.1 MMHG
BH CV ECHO MEAS - AO MEAN PG: 4.3 MMHG
BH CV ECHO MEAS - AO ROOT AREA (BSA CORRECTED): 1.6
BH CV ECHO MEAS - AO ROOT AREA: 5.3 CM^2
BH CV ECHO MEAS - AO ROOT DIAM: 2.6 CM
BH CV ECHO MEAS - AO V2 MAX: 145.7 CM/SEC
BH CV ECHO MEAS - AO V2 MEAN: 98.6 CM/SEC
BH CV ECHO MEAS - AO V2 VTI: 32.1 CM
BH CV ECHO MEAS - AORTIC HR: 184.4 BPM
BH CV ECHO MEAS - AORTIC R-R: 0.33 SEC
BH CV ECHO MEAS - AVA(I,A): 1.8 CM^2
BH CV ECHO MEAS - AVA(I,D): 1.8 CM^2
BH CV ECHO MEAS - AVA(V,A): 1.8 CM^2
BH CV ECHO MEAS - AVA(V,D): 1.8 CM^2
BH CV ECHO MEAS - BSA(HAYCOCK): 1.6 M^2
BH CV ECHO MEAS - BSA: 1.6 M^2
BH CV ECHO MEAS - BZI_BMI: 22.3 KILOGRAMS/M^2
BH CV ECHO MEAS - BZI_METRIC_HEIGHT: 162.6 CM
BH CV ECHO MEAS - BZI_METRIC_WEIGHT: 59 KG
BH CV ECHO MEAS - CI(AO): 19.1 L/MIN/M^2
BH CV ECHO MEAS - CI(LVOT): 6.5 L/MIN/M^2
BH CV ECHO MEAS - CO(AO): 31.1 L/MIN
BH CV ECHO MEAS - CO(LVOT): 10.6 L/MIN
BH CV ECHO MEAS - EDV(CUBED): 57 ML
BH CV ECHO MEAS - EDV(MOD-SP2): 58.4 ML
BH CV ECHO MEAS - EDV(MOD-SP4): 58.3 ML
BH CV ECHO MEAS - EDV(TEICH): 63.9 ML
BH CV ECHO MEAS - EF(CUBED): 82.6 %
BH CV ECHO MEAS - EF(MOD-BP): 62 %
BH CV ECHO MEAS - EF(MOD-SP2): 59 %
BH CV ECHO MEAS - EF(MOD-SP4): 65.4 %
BH CV ECHO MEAS - EF(TEICH): 76.1 %
BH CV ECHO MEAS - ESV(CUBED): 9.9 ML
BH CV ECHO MEAS - ESV(MOD-SP2): 23.9 ML
BH CV ECHO MEAS - ESV(MOD-SP4): 20.2 ML
BH CV ECHO MEAS - ESV(TEICH): 15.3 ML
BH CV ECHO MEAS - FS: 44.1 %
BH CV ECHO MEAS - IVS/LVPW: 1.2
BH CV ECHO MEAS - IVSD: 1.2 CM
BH CV ECHO MEAS - IVSS: 1.8 CM
BH CV ECHO MEAS - LA DIMENSION(2D): 2.2 CM
BH CV ECHO MEAS - LV DIASTOLIC VOL/BSA (35-75): 35.8 ML/M^2
BH CV ECHO MEAS - LV MASS(C)D: 136.2 GRAMS
BH CV ECHO MEAS - LV MASS(C)DI: 83.6 GRAMS/M^2
BH CV ECHO MEAS - LV MASS(C)S: 136.7 GRAMS
BH CV ECHO MEAS - LV MASS(C)SI: 83.9 GRAMS/M^2
BH CV ECHO MEAS - LV MAX PG: 4.8 MMHG
BH CV ECHO MEAS - LV MEAN PG: 2.2 MMHG
BH CV ECHO MEAS - LV SYSTOLIC VOL/BSA (12-30): 12.4 ML/M^2
BH CV ECHO MEAS - LV V1 MAX: 110 CM/SEC
BH CV ECHO MEAS - LV V1 MEAN: 69.3 CM/SEC
BH CV ECHO MEAS - LV V1 VTI: 23.9 CM
BH CV ECHO MEAS - LVIDD: 3.8 CM
BH CV ECHO MEAS - LVIDS: 2.2 CM
BH CV ECHO MEAS - LVOT AREA: 2.4 CM^2
BH CV ECHO MEAS - LVOT DIAM: 1.8 CM
BH CV ECHO MEAS - LVPWD: 1 CM
BH CV ECHO MEAS - LVPWS: 1.6 CM
BH CV ECHO MEAS - MV A MAX VEL: 60.4 CM/SEC
BH CV ECHO MEAS - MV DEC SLOPE: 523.7 CM/SEC^2
BH CV ECHO MEAS - MV DEC TIME: 0.19 SEC
BH CV ECHO MEAS - MV E MAX VEL: 98 CM/SEC
BH CV ECHO MEAS - MV E/A: 1.6
BH CV ECHO MEAS - MV MAX PG: 4.7 MMHG
BH CV ECHO MEAS - MV MEAN PG: 1.1 MMHG
BH CV ECHO MEAS - MV V2 MAX: 108.8 CM/SEC
BH CV ECHO MEAS - MV V2 MEAN: 42.2 CM/SEC
BH CV ECHO MEAS - MV V2 VTI: 32.1 CM
BH CV ECHO MEAS - MVA(VTI): 1.8 CM^2
BH CV ECHO MEAS - PA ACC TIME: 0.13 SEC
BH CV ECHO MEAS - PA MAX PG (FULL): 1.2 MMHG
BH CV ECHO MEAS - PA MAX PG: 3.1 MMHG
BH CV ECHO MEAS - PA MEAN PG (FULL): 0.6 MMHG
BH CV ECHO MEAS - PA MEAN PG: 1.6 MMHG
BH CV ECHO MEAS - PA PR(ACCEL): 18.6 MMHG
BH CV ECHO MEAS - PA V2 MAX: 88.7 CM/SEC
BH CV ECHO MEAS - PA V2 MEAN: 60.9 CM/SEC
BH CV ECHO MEAS - PA V2 VTI: 22.9 CM
BH CV ECHO MEAS - PULM A REVS DUR: 0.08 SEC
BH CV ECHO MEAS - PULM A REVS VEL: 22.1 CM/SEC
BH CV ECHO MEAS - PULM DIAS VEL: 68.4 CM/SEC
BH CV ECHO MEAS - PULM S/D: 0.64
BH CV ECHO MEAS - PULM SYS VEL: 43.8 CM/SEC
BH CV ECHO MEAS - PVA(I,A): 2 CM^2
BH CV ECHO MEAS - PVA(I,D): 2 CM^2
BH CV ECHO MEAS - PVA(V,A): 2.3 CM^2
BH CV ECHO MEAS - PVA(V,D): 2.3 CM^2
BH CV ECHO MEAS - QP/QS: 0.8
BH CV ECHO MEAS - RAP SYSTOLE: 3 MMHG
BH CV ECHO MEAS - RV MAX PG: 1.9 MMHG
BH CV ECHO MEAS - RV MEAN PG: 1 MMHG
BH CV ECHO MEAS - RV V1 MAX: 69.4 CM/SEC
BH CV ECHO MEAS - RV V1 MEAN: 48.2 CM/SEC
BH CV ECHO MEAS - RV V1 VTI: 15.9 CM
BH CV ECHO MEAS - RVDD: 1.8 CM
BH CV ECHO MEAS - RVOT AREA: 2.9 CM^2
BH CV ECHO MEAS - RVOT DIAM: 1.9 CM
BH CV ECHO MEAS - RVSP: 27.7 MMHG
BH CV ECHO MEAS - SI(AO): 103.6 ML/M^2
BH CV ECHO MEAS - SI(CUBED): 28.9 ML/M^2
BH CV ECHO MEAS - SI(LVOT): 35.4 ML/M^2
BH CV ECHO MEAS - SI(MOD-SP2): 21.1 ML/M^2
BH CV ECHO MEAS - SI(MOD-SP4): 23.4 ML/M^2
BH CV ECHO MEAS - SI(TEICH): 29.8 ML/M^2
BH CV ECHO MEAS - SV(AO): 168.7 ML
BH CV ECHO MEAS - SV(CUBED): 47.1 ML
BH CV ECHO MEAS - SV(LVOT): 57.7 ML
BH CV ECHO MEAS - SV(MOD-SP2): 34.4 ML
BH CV ECHO MEAS - SV(MOD-SP4): 38.1 ML
BH CV ECHO MEAS - SV(RVOT): 46.2 ML
BH CV ECHO MEAS - SV(TEICH): 48.6 ML
BH CV ECHO MEAS - TR MAX VEL: 247.8 CM/SEC
BUN BLD-MCNC: 16 MG/DL (ref 8–20)
BUN/CREAT SERPL: 16 (ref 5.4–26.2)
CALCIUM SPEC-SCNC: 8.4 MG/DL (ref 8.9–10.3)
CHLORIDE SERPL-SCNC: 112 MMOL/L (ref 101–111)
CO2 SERPL-SCNC: 20 MMOL/L (ref 22–32)
CREAT BLD-MCNC: 1 MG/DL (ref 0.4–1)
DEPRECATED RDW RBC AUTO: 40.3 FL (ref 37–54)
EOSINOPHIL # BLD AUTO: 0.2 10*3/MM3 (ref 0–0.4)
EOSINOPHIL NFR BLD AUTO: 4.1 % (ref 0.3–6.2)
ERYTHROCYTE [DISTWIDTH] IN BLOOD BY AUTOMATED COUNT: 12.6 % (ref 12.3–15.4)
GFR SERPL CREATININE-BSD FRML MDRD: 58 ML/MIN/1.73
GLUCOSE BLD-MCNC: 94 MG/DL (ref 65–99)
HCT VFR BLD AUTO: 29 % (ref 34–46.6)
HGB BLD-MCNC: 9.9 G/DL (ref 12–15.9)
LYMPHOCYTES # BLD AUTO: 2.1 10*3/MM3 (ref 0.7–3.1)
LYMPHOCYTES NFR BLD AUTO: 35.1 % (ref 19.6–45.3)
MAXIMAL PREDICTED HEART RATE: 165 BPM
MCH RBC QN AUTO: 30.4 PG (ref 26.6–33)
MCHC RBC AUTO-ENTMCNC: 34.1 G/DL (ref 31.5–35.7)
MCV RBC AUTO: 89.1 FL (ref 79–97)
MONOCYTES # BLD AUTO: 0.4 10*3/MM3 (ref 0.1–0.9)
MONOCYTES NFR BLD AUTO: 6.9 % (ref 5–12)
NEUTROPHILS # BLD AUTO: 3.2 10*3/MM3 (ref 1.7–7)
NEUTROPHILS NFR BLD AUTO: 53 % (ref 42.7–76)
NRBC BLD AUTO-RTO: 0.1 /100 WBC (ref 0–0.2)
PLATELET # BLD AUTO: 204 10*3/MM3 (ref 140–450)
PMV BLD AUTO: 7.4 FL (ref 6–12)
POTASSIUM BLD-SCNC: 3.7 MMOL/L (ref 3.6–5.1)
RBC # BLD AUTO: 3.26 10*6/MM3 (ref 3.77–5.28)
SODIUM BLD-SCNC: 140 MMOL/L (ref 136–144)
STRESS TARGET HR: 140 BPM
WBC NRBC COR # BLD: 6 10*3/MM3 (ref 3.4–10.8)

## 2019-07-25 PROCEDURE — G0378 HOSPITAL OBSERVATION PER HR: HCPCS

## 2019-07-25 PROCEDURE — 97161 PT EVAL LOW COMPLEX 20 MIN: CPT

## 2019-07-25 PROCEDURE — 80048 BASIC METABOLIC PNL TOTAL CA: CPT | Performed by: PHYSICIAN ASSISTANT

## 2019-07-25 PROCEDURE — 99217 PR OBSERVATION CARE DISCHARGE MANAGEMENT: CPT | Performed by: HOSPITALIST

## 2019-07-25 PROCEDURE — 85025 COMPLETE CBC W/AUTO DIFF WBC: CPT | Performed by: PHYSICIAN ASSISTANT

## 2019-07-25 PROCEDURE — 93306 TTE W/DOPPLER COMPLETE: CPT

## 2019-07-25 PROCEDURE — 93306 TTE W/DOPPLER COMPLETE: CPT | Performed by: INTERNAL MEDICINE

## 2019-07-25 RX ORDER — LEVOTHYROXINE SODIUM 0.12 MG/1
125 TABLET ORAL SEE ADMIN INSTRUCTIONS
Start: 2019-07-25

## 2019-07-25 RX ADMIN — DOXYCYCLINE 50 MG: 100 TABLET, FILM COATED ORAL at 08:59

## 2019-07-25 RX ADMIN — Medication 3 ML: at 09:00

## 2019-07-25 RX ADMIN — CALCITRIOL 0.25 MCG: 0.25 CAPSULE, LIQUID FILLED ORAL at 09:00

## 2019-07-25 RX ADMIN — AMLODIPINE BESYLATE 5 MG: 5 TABLET ORAL at 09:00

## 2019-07-26 ENCOUNTER — READMISSION MANAGEMENT (OUTPATIENT)
Dept: CALL CENTER | Facility: HOSPITAL | Age: 54
End: 2019-07-26

## 2019-07-27 NOTE — OUTREACH NOTE
Prep Survey      Responses   Facility patient discharged from?  [facility]   Is patient eligible?  Yes   Discharge diagnosis  Dyspnea   Does the patient have one of the following disease processes/diagnoses(primary or secondary)?  Other   Does the patient have Home health ordered?  No   Is there a DME ordered?  No   Prep survey completed?  Yes          Amna Mac RN

## 2019-07-29 ENCOUNTER — READMISSION MANAGEMENT (OUTPATIENT)
Dept: CALL CENTER | Facility: HOSPITAL | Age: 54
End: 2019-07-29

## 2019-07-29 NOTE — OUTREACH NOTE
Medical Week 1 Survey      Responses   Facility patient discharged from?  [facility]   Does the patient have one of the following disease processes/diagnoses(primary or secondary)?  Other   Is there a successful TCM telephone encounter documented?  No   Week 1 attempt successful?  No   Unsuccessful attempts  Attempt 4 [NO ANSWER, LEFT VM]          oRssy Galindo LPN

## 2019-08-05 ENCOUNTER — READMISSION MANAGEMENT (OUTPATIENT)
Dept: CALL CENTER | Facility: HOSPITAL | Age: 54
End: 2019-08-05

## 2019-08-05 NOTE — OUTREACH NOTE
Medical Week 2 Survey      Responses   Facility patient discharged from?  [facility]   Does the patient have one of the following disease processes/diagnoses(primary or secondary)?  Other   Week 2 attempt successful?  No   Revoke  Decline to participate [unsuccesful attempt]          Mary Moya RN

## 2020-04-20 ENCOUNTER — LAB REQUISITION (OUTPATIENT)
Dept: LAB | Facility: HOSPITAL | Age: 55
End: 2020-04-20

## 2020-04-20 DIAGNOSIS — Z00.00 ENCOUNTER FOR GENERAL ADULT MEDICAL EXAMINATION WITHOUT ABNORMAL FINDINGS: ICD-10-CM

## 2020-04-20 PROCEDURE — 87635 SARS-COV-2 COVID-19 AMP PRB: CPT | Performed by: EMERGENCY MEDICINE

## 2020-04-20 PROCEDURE — U0003 INFECTIOUS AGENT DETECTION BY NUCLEIC ACID (DNA OR RNA); SEVERE ACUTE RESPIRATORY SYNDROME CORONAVIRUS 2 (SARS-COV-2) (CORONAVIRUS DISEASE [COVID-19]), AMPLIFIED PROBE TECHNIQUE, MAKING USE OF HIGH THROUGHPUT TECHNOLOGIES AS DESCRIBED BY CMS-2020-01-R: HCPCS | Performed by: EMERGENCY MEDICINE

## 2020-04-22 LAB — SARS-COV-2 RNA RESP QL NAA+PROBE: NOT DETECTED

## 2020-11-10 ENCOUNTER — TRANSCRIBE ORDERS (OUTPATIENT)
Dept: ADMINISTRATIVE | Facility: HOSPITAL | Age: 55
End: 2020-11-10

## 2020-11-10 ENCOUNTER — HOSPITAL ENCOUNTER (OUTPATIENT)
Dept: CT IMAGING | Facility: HOSPITAL | Age: 55
Discharge: HOME | End: 2020-11-10
Admitting: PHYSICIAN ASSISTANT

## 2020-11-10 DIAGNOSIS — S09.90XA HEAD INJURY WITHOUT SKULL FRACTURE, INITIAL ENCOUNTER: ICD-10-CM

## 2020-11-10 DIAGNOSIS — S09.90XA HEAD INJURY WITHOUT SKULL FRACTURE, INITIAL ENCOUNTER: Primary | ICD-10-CM

## 2020-11-10 PROCEDURE — 70486 CT MAXILLOFACIAL W/O DYE: CPT

## 2020-11-10 PROCEDURE — 70450 CT HEAD/BRAIN W/O DYE: CPT

## 2024-10-23 ENCOUNTER — OFFICE (AMBULATORY)
Age: 59
End: 2024-10-23
Payer: COMMERCIAL

## 2024-10-23 ENCOUNTER — OFFICE (AMBULATORY)
Dept: URBAN - METROPOLITAN AREA CLINIC 64 | Facility: CLINIC | Age: 59
End: 2024-10-23
Payer: COMMERCIAL

## 2024-10-23 VITALS
SYSTOLIC BLOOD PRESSURE: 140 MMHG | DIASTOLIC BLOOD PRESSURE: 86 MMHG | SYSTOLIC BLOOD PRESSURE: 140 MMHG | DIASTOLIC BLOOD PRESSURE: 86 MMHG | HEART RATE: 65 BPM | HEIGHT: 64 IN | HEIGHT: 64 IN | WEIGHT: 138 LBS | DIASTOLIC BLOOD PRESSURE: 86 MMHG | SYSTOLIC BLOOD PRESSURE: 140 MMHG | HEIGHT: 64 IN | HEIGHT: 64 IN | HEART RATE: 65 BPM | WEIGHT: 138 LBS | HEART RATE: 65 BPM | DIASTOLIC BLOOD PRESSURE: 86 MMHG | HEIGHT: 64 IN | WEIGHT: 138 LBS | WEIGHT: 138 LBS | SYSTOLIC BLOOD PRESSURE: 140 MMHG | HEIGHT: 64 IN | WEIGHT: 138 LBS | SYSTOLIC BLOOD PRESSURE: 140 MMHG | WEIGHT: 138 LBS | HEART RATE: 65 BPM | HEART RATE: 65 BPM | HEART RATE: 65 BPM | SYSTOLIC BLOOD PRESSURE: 140 MMHG | HEIGHT: 64 IN | SYSTOLIC BLOOD PRESSURE: 140 MMHG | DIASTOLIC BLOOD PRESSURE: 86 MMHG | HEART RATE: 65 BPM | DIASTOLIC BLOOD PRESSURE: 86 MMHG | DIASTOLIC BLOOD PRESSURE: 86 MMHG | WEIGHT: 138 LBS

## 2024-10-23 DIAGNOSIS — K21.9 GASTRO-ESOPHAGEAL REFLUX DISEASE WITHOUT ESOPHAGITIS: ICD-10-CM

## 2024-10-23 DIAGNOSIS — R10.13 EPIGASTRIC PAIN: ICD-10-CM

## 2024-10-23 DIAGNOSIS — R11.0 NAUSEA: ICD-10-CM

## 2024-10-23 DIAGNOSIS — K52.9 NONINFECTIVE GASTROENTERITIS AND COLITIS, UNSPECIFIED: ICD-10-CM

## 2024-10-23 PROCEDURE — 99203 OFFICE O/P NEW LOW 30 MIN: CPT | Performed by: INTERNAL MEDICINE

## 2024-10-23 RX ORDER — OMEPRAZOLE 40 MG/1
40 CAPSULE, DELAYED RELEASE ORAL
Qty: 90 | Refills: 3 | Status: COMPLETED
Start: 2018-03-08 | End: 2024-10-23

## 2024-10-29 ENCOUNTER — TRANSCRIBE ORDERS (OUTPATIENT)
Dept: ADMINISTRATIVE | Facility: HOSPITAL | Age: 59
End: 2024-10-29
Payer: COMMERCIAL

## 2024-10-29 DIAGNOSIS — K52.9 CHRONIC DIARRHEA: ICD-10-CM

## 2024-10-29 DIAGNOSIS — K21.9 GASTROESOPHAGEAL REFLUX DISEASE, UNSPECIFIED WHETHER ESOPHAGITIS PRESENT: ICD-10-CM

## 2024-10-29 DIAGNOSIS — R11.0 NAUSEA: Primary | ICD-10-CM

## 2024-10-29 DIAGNOSIS — R10.13 EPIGASTRIC PAIN: ICD-10-CM

## 2024-11-12 ENCOUNTER — TRANSCRIBE ORDERS (OUTPATIENT)
Dept: ADMINISTRATIVE | Facility: HOSPITAL | Age: 59
End: 2024-11-12
Payer: COMMERCIAL

## 2024-11-12 DIAGNOSIS — C73 CANCER OF THYROID: ICD-10-CM

## 2024-11-12 DIAGNOSIS — Z86.018 PERSONAL HISTORY OF PHEOCHROMOCYTOMA: Primary | ICD-10-CM

## 2024-11-20 ENCOUNTER — TRANSCRIBE ORDERS (OUTPATIENT)
Dept: ADMINISTRATIVE | Facility: HOSPITAL | Age: 59
End: 2024-11-20
Payer: COMMERCIAL

## 2024-11-20 DIAGNOSIS — C73 THYROID CANCER: ICD-10-CM

## 2024-11-20 DIAGNOSIS — Z86.018 PERSONAL HISTORY OF PHEOCHROMOCYTOMA: Primary | ICD-10-CM

## 2024-11-22 ENCOUNTER — TRANSCRIBE ORDERS (OUTPATIENT)
Dept: ADMINISTRATIVE | Facility: HOSPITAL | Age: 59
End: 2024-11-22
Payer: COMMERCIAL

## 2024-11-22 DIAGNOSIS — I25.10 ASCVD (ARTERIOSCLEROTIC CARDIOVASCULAR DISEASE): Primary | ICD-10-CM

## 2024-12-17 ENCOUNTER — HOSPITAL ENCOUNTER (OUTPATIENT)
Dept: CARDIOLOGY | Facility: HOSPITAL | Age: 59
Discharge: HOME | End: 2024-12-17
Payer: COMMERCIAL

## 2024-12-17 ENCOUNTER — HOSPITAL ENCOUNTER (OUTPATIENT)
Dept: MRI IMAGING | Facility: HOSPITAL | Age: 59
Discharge: HOME | End: 2024-12-17
Payer: COMMERCIAL

## 2024-12-17 DIAGNOSIS — C73 THYROID CANCER: ICD-10-CM

## 2024-12-17 DIAGNOSIS — Z86.018 PERSONAL HISTORY OF PHEOCHROMOCYTOMA: ICD-10-CM

## 2024-12-17 DIAGNOSIS — I25.10 ASCVD (ARTERIOSCLEROTIC CARDIOVASCULAR DISEASE): ICD-10-CM

## 2024-12-17 LAB
BH CV XLRA MEAS LEFT CAROTID BULB PSV: -133 CM/SEC
BH CV XLRA MEAS LEFT DIST CCA EDV: 26.3 CM/SEC
BH CV XLRA MEAS LEFT DIST CCA PSV: 71.9 CM/SEC
BH CV XLRA MEAS LEFT DIST ICA EDV: -42.5 CM/SEC
BH CV XLRA MEAS LEFT DIST ICA PSV: -133 CM/SEC
BH CV XLRA MEAS LEFT ICA/CCA RATIO: -1.85
BH CV XLRA MEAS LEFT PROX CCA EDV: 19.8 CM/SEC
BH CV XLRA MEAS LEFT PROX CCA PSV: 60.4 CM/SEC
BH CV XLRA MEAS LEFT PROX ECA PSV: -175 CM/SEC
BH CV XLRA MEAS LEFT PROX ICA EDV: -31.1 CM/SEC
BH CV XLRA MEAS LEFT PROX ICA PSV: -75.2 CM/SEC
BH CV XLRA MEAS LEFT PROX SCLA PSV: 169 CM/SEC
BH CV XLRA MEAS LEFT VERTEBRAL A PSV: -49.4 CM/SEC
BH CV XLRA MEAS RIGHT CAROTID BULB PSV: -75.4 CM/SEC
BH CV XLRA MEAS RIGHT DIST CCA EDV: 28.5 CM/SEC
BH CV XLRA MEAS RIGHT DIST CCA PSV: 76.2 CM/SEC
BH CV XLRA MEAS RIGHT DIST ICA EDV: -32.4 CM/SEC
BH CV XLRA MEAS RIGHT DIST ICA PSV: -99.8 CM/SEC
BH CV XLRA MEAS RIGHT ICA/CCA RATIO: -1.31
BH CV XLRA MEAS RIGHT PROX CCA EDV: 15.7 CM/SEC
BH CV XLRA MEAS RIGHT PROX CCA PSV: 55.5 CM/SEC
BH CV XLRA MEAS RIGHT PROX ECA PSV: -145 CM/SEC
BH CV XLRA MEAS RIGHT PROX ICA EDV: -37.3 CM/SEC
BH CV XLRA MEAS RIGHT PROX ICA PSV: -91 CM/SEC
BH CV XLRA MEAS RIGHT PROX SCLA PSV: 114 CM/SEC
BH CV XLRA MEAS RIGHT VERTEBRAL A PSV: -66.7 CM/SEC
LEFT ARM BP: NORMAL MMHG
RIGHT ARM BP: NORMAL MMHG

## 2024-12-17 PROCEDURE — 93880 EXTRACRANIAL BILAT STUDY: CPT

## 2024-12-17 PROCEDURE — 70551 MRI BRAIN STEM W/O DYE: CPT

## 2025-04-02 ENCOUNTER — OFFICE (AMBULATORY)
Dept: URBAN - METROPOLITAN AREA CLINIC 64 | Facility: CLINIC | Age: 60
End: 2025-04-02